# Patient Record
Sex: FEMALE | Race: WHITE | NOT HISPANIC OR LATINO | Employment: FULL TIME | URBAN - METROPOLITAN AREA
[De-identification: names, ages, dates, MRNs, and addresses within clinical notes are randomized per-mention and may not be internally consistent; named-entity substitution may affect disease eponyms.]

---

## 2023-10-16 ENCOUNTER — APPOINTMENT (OUTPATIENT)
Dept: PRIMARY CARE | Facility: CLINIC | Age: 28
End: 2023-10-16
Payer: COMMERCIAL

## 2023-10-16 ENCOUNTER — APPOINTMENT (OUTPATIENT)
Dept: GASTROENTEROLOGY | Facility: EXTERNAL LOCATION | Age: 28
End: 2023-10-16
Payer: COMMERCIAL

## 2023-10-16 DIAGNOSIS — K21.9 GASTRO-ESOPHAGEAL REFLUX DISEASE WITHOUT ESOPHAGITIS: ICD-10-CM

## 2023-11-02 ENCOUNTER — APPOINTMENT (OUTPATIENT)
Dept: PRIMARY CARE | Facility: CLINIC | Age: 28
End: 2023-11-02
Payer: COMMERCIAL

## 2024-05-28 ENCOUNTER — TRANSCRIBE ORDERS (OUTPATIENT)
Dept: OBSTETRICS AND GYNECOLOGY | Facility: CLINIC | Age: 29
End: 2024-05-28
Payer: COMMERCIAL

## 2024-05-28 DIAGNOSIS — N91.1 SECONDARY AMENORRHEA: Primary | ICD-10-CM

## 2024-06-06 ENCOUNTER — HOSPITAL ENCOUNTER (EMERGENCY)
Facility: HOSPITAL | Age: 29
Discharge: HOME | End: 2024-06-06
Attending: INTERNAL MEDICINE
Payer: COMMERCIAL

## 2024-06-06 ENCOUNTER — APPOINTMENT (OUTPATIENT)
Dept: RADIOLOGY | Facility: HOSPITAL | Age: 29
End: 2024-06-06
Payer: COMMERCIAL

## 2024-06-06 VITALS
WEIGHT: 115 LBS | DIASTOLIC BLOOD PRESSURE: 83 MMHG | SYSTOLIC BLOOD PRESSURE: 123 MMHG | RESPIRATION RATE: 15 BRPM | OXYGEN SATURATION: 98 % | BODY MASS INDEX: 22.58 KG/M2 | TEMPERATURE: 98.7 F | HEART RATE: 75 BPM | HEIGHT: 60 IN

## 2024-06-06 DIAGNOSIS — O20.9 VAGINAL BLEEDING AFFECTING EARLY PREGNANCY (HHS-HCC): Primary | ICD-10-CM

## 2024-06-06 LAB
ABO GROUP (TYPE) IN BLOOD: NORMAL
ALBUMIN SERPL BCP-MCNC: 4.6 G/DL (ref 3.4–5)
ALP SERPL-CCNC: 30 U/L (ref 33–110)
ALT SERPL W P-5'-P-CCNC: 6 U/L (ref 7–45)
AMORPH CRY #/AREA UR COMP ASSIST: ABNORMAL /HPF
ANION GAP SERPL CALC-SCNC: 12 MMOL/L (ref 10–20)
ANTIBODY SCREEN: NORMAL
APPEARANCE UR: ABNORMAL
AST SERPL W P-5'-P-CCNC: 13 U/L (ref 9–39)
B-HCG SERPL-ACNC: 1124 MIU/ML
BACTERIA #/AREA URNS AUTO: ABNORMAL /HPF
BASOPHILS # BLD AUTO: 0.04 X10*3/UL (ref 0–0.1)
BASOPHILS NFR BLD AUTO: 0.5 %
BILIRUB SERPL-MCNC: 0.2 MG/DL (ref 0–1.2)
BILIRUB UR STRIP.AUTO-MCNC: NEGATIVE MG/DL
BUN SERPL-MCNC: 7 MG/DL (ref 6–23)
CALCIUM SERPL-MCNC: 9 MG/DL (ref 8.6–10.3)
CHLORIDE SERPL-SCNC: 106 MMOL/L (ref 98–107)
CO2 SERPL-SCNC: 24 MMOL/L (ref 21–32)
COLOR UR: ABNORMAL
CREAT SERPL-MCNC: 0.66 MG/DL (ref 0.5–1.05)
EGFRCR SERPLBLD CKD-EPI 2021: >90 ML/MIN/1.73M*2
EOSINOPHIL # BLD AUTO: 0.04 X10*3/UL (ref 0–0.7)
EOSINOPHIL NFR BLD AUTO: 0.5 %
ERYTHROCYTE [DISTWIDTH] IN BLOOD BY AUTOMATED COUNT: 11.9 % (ref 11.5–14.5)
GLUCOSE SERPL-MCNC: 90 MG/DL (ref 74–99)
GLUCOSE UR STRIP.AUTO-MCNC: NORMAL MG/DL
HCT VFR BLD AUTO: 39.1 % (ref 36–46)
HGB BLD-MCNC: 12.7 G/DL (ref 12–16)
IMM GRANULOCYTES # BLD AUTO: 0.03 X10*3/UL (ref 0–0.7)
IMM GRANULOCYTES NFR BLD AUTO: 0.4 % (ref 0–0.9)
KETONES UR STRIP.AUTO-MCNC: NEGATIVE MG/DL
LEUKOCYTE ESTERASE UR QL STRIP.AUTO: NEGATIVE
LYMPHOCYTES # BLD AUTO: 2.32 X10*3/UL (ref 1.2–4.8)
LYMPHOCYTES NFR BLD AUTO: 29.1 %
MCH RBC QN AUTO: 29.5 PG (ref 26–34)
MCHC RBC AUTO-ENTMCNC: 32.5 G/DL (ref 32–36)
MCV RBC AUTO: 91 FL (ref 80–100)
MONOCYTES # BLD AUTO: 0.46 X10*3/UL (ref 0.1–1)
MONOCYTES NFR BLD AUTO: 5.8 %
NEUTROPHILS # BLD AUTO: 5.08 X10*3/UL (ref 1.2–7.7)
NEUTROPHILS NFR BLD AUTO: 63.7 %
NITRITE UR QL STRIP.AUTO: NEGATIVE
NRBC BLD-RTO: 0 /100 WBCS (ref 0–0)
PH UR STRIP.AUTO: 8 [PH]
PLATELET # BLD AUTO: 323 X10*3/UL (ref 150–450)
POTASSIUM SERPL-SCNC: 4.2 MMOL/L (ref 3.5–5.3)
PROT SERPL-MCNC: 7.4 G/DL (ref 6.4–8.2)
PROT UR STRIP.AUTO-MCNC: NEGATIVE MG/DL
RBC # BLD AUTO: 4.31 X10*6/UL (ref 4–5.2)
RBC # UR STRIP.AUTO: ABNORMAL /UL
RBC #/AREA URNS AUTO: ABNORMAL /HPF
RH FACTOR (ANTIGEN D): NORMAL
SODIUM SERPL-SCNC: 138 MMOL/L (ref 136–145)
SP GR UR STRIP.AUTO: 1.01
SQUAMOUS #/AREA URNS AUTO: ABNORMAL /HPF
UROBILINOGEN UR STRIP.AUTO-MCNC: NORMAL MG/DL
WBC # BLD AUTO: 8 X10*3/UL (ref 4.4–11.3)
WBC #/AREA URNS AUTO: ABNORMAL /HPF

## 2024-06-06 PROCEDURE — 76801 OB US < 14 WKS SINGLE FETUS: CPT

## 2024-06-06 PROCEDURE — 86901 BLOOD TYPING SEROLOGIC RH(D): CPT

## 2024-06-06 PROCEDURE — 76815 OB US LIMITED FETUS(S): CPT | Performed by: RADIOLOGY

## 2024-06-06 PROCEDURE — 86900 BLOOD TYPING SEROLOGIC ABO: CPT

## 2024-06-06 PROCEDURE — 85025 COMPLETE CBC W/AUTO DIFF WBC: CPT

## 2024-06-06 PROCEDURE — 99284 EMERGENCY DEPT VISIT MOD MDM: CPT | Mod: 25

## 2024-06-06 PROCEDURE — 84702 CHORIONIC GONADOTROPIN TEST: CPT

## 2024-06-06 PROCEDURE — 81001 URINALYSIS AUTO W/SCOPE: CPT

## 2024-06-06 PROCEDURE — 87086 URINE CULTURE/COLONY COUNT: CPT | Mod: AHULAB

## 2024-06-06 PROCEDURE — 76817 TRANSVAGINAL US OBSTETRIC: CPT | Performed by: RADIOLOGY

## 2024-06-06 PROCEDURE — 36415 COLL VENOUS BLD VENIPUNCTURE: CPT

## 2024-06-06 PROCEDURE — 80053 COMPREHEN METABOLIC PANEL: CPT

## 2024-06-06 RX ORDER — ACETAMINOPHEN 325 MG/1
650 TABLET ORAL ONCE
Status: COMPLETED | OUTPATIENT
Start: 2024-06-06 | End: 2024-06-06

## 2024-06-06 RX ADMIN — ACETAMINOPHEN 650 MG: 325 TABLET ORAL at 15:43

## 2024-06-06 ASSESSMENT — COLUMBIA-SUICIDE SEVERITY RATING SCALE - C-SSRS
6. HAVE YOU EVER DONE ANYTHING, STARTED TO DO ANYTHING, OR PREPARED TO DO ANYTHING TO END YOUR LIFE?: NO
1. IN THE PAST MONTH, HAVE YOU WISHED YOU WERE DEAD OR WISHED YOU COULD GO TO SLEEP AND NOT WAKE UP?: NO
2. HAVE YOU ACTUALLY HAD ANY THOUGHTS OF KILLING YOURSELF?: NO

## 2024-06-06 ASSESSMENT — PAIN - FUNCTIONAL ASSESSMENT: PAIN_FUNCTIONAL_ASSESSMENT: 0-10

## 2024-06-06 ASSESSMENT — PAIN SCALES - GENERAL: PAINLEVEL_OUTOF10: 6

## 2024-06-06 NOTE — Clinical Note
Elmira Harrison was seen and treated in our emergency department on 6/6/2024.  She may return to work on 06/11/2024.       If you have any questions or concerns, please don't hesitate to call.      Marnie Valdovinos PA-C

## 2024-06-06 NOTE — ED PROVIDER NOTES
HPI   Chief Complaint   Patient presents with    Threatened Miscarriage       HPI  HISTORY OF PRESENT ILLNESS:  29 y.o. female  at estimated 7 weeks gestation presenting to the ED with complaint of abdominal cramping and vaginal bleeding.  Patient is unsure exactly how far along she is, believes she is about 7 weeks pregnant.  She states that today while at work, she began having lower abdominal cramping, slightly worse on the left side, but present across the lower abdomen.  She states that about an hour prior to arrival she began having light vaginal bleeding.  Has not bled through a pad since onset.  She denies any similar symptoms with her prior pregnancy.  She has not yet followed up with OB/GYN has not yet had ultrasound or prenatal care for this pregnancy.  She denies fevers or chills.  No nausea or vomiting, no constipation or diarrhea.  No abnormal vaginal discharge, odor, itching, or pain, denies any concern for STDs.  Denies urinary symptoms.  No chest pain or shortness of breath.  No dizziness or lightheadedness, no syncope.  No other complaints or symptoms voiced.    12 point review of systems was performed and is negative unless otherwise specified in HPI.    PMH: reviewed  Family history: noncontributory  Social history: non smoker, no ETOH, no illicit substances  Past Medical History:   Diagnosis Date    23 weeks gestation of pregnancy ([facility]) 2017    23 weeks gestation of pregnancy    Acute gastric ulcer without hemorrhage or perforation 2015    Acute gastric ulcer    Acute upper respiratory infection, unspecified 10/01/2020    Acute upper respiratory infection    Allergic purpura (CMS-HCC) 2013    Henoch-Schonlein purpura    Chest pain, unspecified 2013    Chest pain    Contusion of unspecified back wall of thorax, initial encounter 2013    Contusion of back    Edema, unspecified 2013    Edema    Gastro-esophageal reflux disease without esophagitis  12/11/2013    Esophageal reflux    Intestinal malabsorption, unspecified ([facility]) 12/11/2013    Malabsorption syndrome    Iron deficiency anemia secondary to blood loss (chronic) 12/11/2013    Iron deficiency anemia due to chronic blood loss    Nausea 12/11/2013    Nausea    Nonspecific lymphadenitis, unspecified 12/11/2013    Lymphadenitis    Other conditions influencing health status 12/11/2013    Endomyometritis    Other conditions influencing health status 12/11/2013    Inflammatory Myopathy (Myositis)    Other conditions influencing health status 12/11/2013    Attention-deficit Hyperactivity Disorder    Other conditions influencing health status     History of burning on urination    Other disorders of intestinal carbohydrate absorption 12/11/2013    Glucose intolerance (malabsorption)    Other fatigue 12/11/2013    Fatigue    Other pulmonary embolism without acute cor pulmonale (Multi) 12/11/2013    Pulmonary embolism    Pain in unspecified shoulder 12/11/2013    Pain, joint, shoulder    Personal history of other diseases of the female genital tract 02/22/2016    History of endometriosis    Personal history of other diseases of the respiratory system 10/25/2016    History of acute pharyngitis    Personal history of other specified conditions 11/30/2016    History of abnormal weight loss    Personal history of other specified conditions 11/04/2016    History of fatigue    Phlebitis and thrombophlebitis of unspecified site 12/11/2013    Thrombophlebitis    Sciatica, unspecified side 12/11/2013    Sciatica    Unspecified abdominal pain 12/11/2013    Abdominal pain    Unspecified sprain of unspecified wrist, initial encounter 05/10/2017    Wrist sprain    Zoster without complications 12/11/2013    Herpes zoster         Abnormal Labs Reviewed   HUMAN CHORIONIC GONADOTROPIN, SERUM QUANTITATIVE - Abnormal; Notable for the following components:       Result Value    HCG, Beta-Quantitative 1,124 (*)     All other  components within normal limits    Narrative:      Total HCG measurement is performed using the [employer] Estef[employer] Access   Immunoassay which detects intact HCG and free beta HCG subunit.    This test is not indicated for use as a tumor marker.   HCG testing is performed using a different test methodology at [facility] than other [facility]. Direct result comparison   should only be made within the same method.       COMPREHENSIVE METABOLIC PANEL - Abnormal; Notable for the following components:    Alkaline Phosphatase 30 (*)     ALT 6 (*)     All other components within normal limits   URINALYSIS WITH REFLEX CULTURE AND MICROSCOPIC - Abnormal; Notable for the following components:    Appearance, Urine Turbid (*)     Blood, Urine 1.0 (3+) (*)     All other components within normal limits   URINALYSIS MICROSCOPIC WITH REFLEX CULTURE - Abnormal; Notable for the following components:    WBC, Urine 6-10 (*)     Bacteria, Urine 1+ (*)     Amorphous Crystals, Urine 4+ (*)     All other components within normal limits      US PELVIS OB TRANSABDOMINAL W TRANSVAGINAL UP TO 1ST TRIMESTER   Final Result   No intrauterine gestation is observed.        The endometrium is thickened at 1.6 cm and heterogeneous in   appearance. Based on history, the findings are most likely due to   incomplete spontaneous  with some retained products of   conception suggested. I would recommend repeat serial beta HCG level   in 48 hours.        The possibility of an ectopic gestation not visualized   transabdominally or transvaginally can not be entirely excluded.        Signed by: Gayle Lorenz 2024 4:52 PM   Dictation workstation:   [id number]                  No data recorded                   Patient History   Past Medical History:   Diagnosis Date    23 weeks gestation of pregnancy ([facility]) 2017    23 weeks gestation of pregnancy    Acute gastric ulcer without hemorrhage or perforation 2015    Acute  gastric ulcer    Acute upper respiratory infection, unspecified 10/01/2020    Acute upper respiratory infection    Allergic purpura (CMS-HCC) 12/11/2013    Henoch-Schonlein purpura    Chest pain, unspecified 12/11/2013    Chest pain    Contusion of unspecified back wall of thorax, initial encounter 12/11/2013    Contusion of back    Edema, unspecified 12/11/2013    Edema    Gastro-esophageal reflux disease without esophagitis 12/11/2013    Esophageal reflux    Intestinal malabsorption, unspecified ([facility]) 12/11/2013    Malabsorption syndrome    Iron deficiency anemia secondary to blood loss (chronic) 12/11/2013    Iron deficiency anemia due to chronic blood loss    Nausea 12/11/2013    Nausea    Nonspecific lymphadenitis, unspecified 12/11/2013    Lymphadenitis    Other conditions influencing health status 12/11/2013    Endomyometritis    Other conditions influencing health status 12/11/2013    Inflammatory Myopathy (Myositis)    Other conditions influencing health status 12/11/2013    Attention-deficit Hyperactivity Disorder    Other conditions influencing health status     History of burning on urination    Other disorders of intestinal carbohydrate absorption 12/11/2013    Glucose intolerance (malabsorption)    Other fatigue 12/11/2013    Fatigue    Other pulmonary embolism without acute cor pulmonale (Multi) 12/11/2013    Pulmonary embolism    Pain in unspecified shoulder 12/11/2013    Pain, joint, shoulder    Personal history of other diseases of the female genital tract 02/22/2016    History of endometriosis    Personal history of other diseases of the respiratory system 10/25/2016    History of acute pharyngitis    Personal history of other specified conditions 11/30/2016    History of abnormal weight loss    Personal history of other specified conditions 11/04/2016    History of fatigue    Phlebitis and thrombophlebitis of unspecified site 12/11/2013    Thrombophlebitis    Sciatica, unspecified side  12/11/2013    Sciatica    Unspecified abdominal pain 12/11/2013    Abdominal pain    Unspecified sprain of unspecified wrist, initial encounter 05/10/2017    Wrist sprain    Zoster without complications 12/11/2013    Herpes zoster     Past Surgical History:   Procedure Laterality Date    CHOLECYSTECTOMY  06/03/2016    Cholecystectomy Laparoscopic    LAPAROSCOPY DIAGNOSTIC / BIOPSY / ASPIRATION / LYSIS  02/22/2016    Exploratory Laparoscopy     No family history on file.  Social History     Tobacco Use    Smoking status: Not on file    Smokeless tobacco: Not on file   Substance Use Topics    Alcohol use: Not on file    Drug use: Not on file       Physical Exam   ED Triage Vitals [06/06/24 1457]   Temperature Heart Rate Respirations BP   37.1 °C (98.7 °F) 75 15 123/83      Pulse Ox Temp src Heart Rate Source Patient Position   98 % -- -- --      BP Location FiO2 (%)     -- --       Physical Exam  Constitutional:       General: She is not in acute distress.     Appearance: She is not toxic-appearing.   HENT:      Head: Normocephalic and atraumatic.      Nose: No congestion.      Mouth/Throat:      Mouth: Mucous membranes are moist.   Eyes:      General: No scleral icterus.     Extraocular Movements: Extraocular movements intact.      Pupils: Pupils are equal, round, and reactive to light.   Cardiovascular:      Rate and Rhythm: Normal rate and regular rhythm.      Pulses: Normal pulses.   Pulmonary:      Effort: Pulmonary effort is normal. No respiratory distress.   Abdominal:      General: There is no distension.      Palpations: Abdomen is soft.      Tenderness: There is abdominal tenderness. There is no guarding or rebound.      Comments: Mild tenderness LLQ and suprapubic area. No distention, no guarding or rigidity, no peritoneal signs.   Genitourinary:     Comments: Pelvic:  Chaperone present.  Normal external genitalia. No laceration or foreign body.  Small amount of dark red blood in vaginal vault, no active  hemorrhage.  Cervix os is OPEN. No cervical motion tenderness.  No adnexal tenderness.   Musculoskeletal:         General: Normal range of motion.      Cervical back: Normal range of motion and neck supple. No rigidity.      Right lower leg: No edema.      Left lower leg: No edema.   Skin:     General: Skin is warm and dry.      Capillary Refill: Capillary refill takes less than 2 seconds.   Neurological:      General: No focal deficit present.      Mental Status: She is alert and oriented to person, place, and time.      Cranial Nerves: No cranial nerve deficit.      Coordination: Coordination normal.      Gait: Gait normal.   Psychiatric:         Mood and Affect: Mood normal.         Behavior: Behavior normal.         Judgment: Judgment normal.         ED Course & MDM   ED Course as of 24   Thu  Discussed with OB/GYN attending Dr. Frankel via phone, discussed patient case and presentation, labs, and imaging, agrees most likely spontaneous , recommends close follow-up outpatient tomorrow Monday for a repeat quantitative hCG, and strict return precautions if symptoms worsen. [EH]      ED Course User Index  [EH] Marnie Valdovinos PA-C         Diagnoses as of 24   Vaginal bleeding affecting early pregnancy ([facility])       Medical Decision Making  ED course / MDM     Summary:  Patient presented with lower abdominal cramping and vaginal bleeding, estimated 7 weeks gestation.  Vital signs are stable, patient is overall very well-appearing.  Ambulates unassisted, no acute distress.  On exam, there is mild tenderness in the lower abdomen, no distention, no guarding or rigidity.  No CVA tenderness.  On pelvic exam, there is a small amount of dark red blood in the vaginal vault, cervical os is open, nontender pelvic.  IV established, labs drawn.  Labs show no leukocytosis, normal hemoglobin, no significant electrolyte abnormalities, normal kidney function.  UA noninfected.  hCG  is 1124.  Rh+, does not require RhoGAM.  Pelvic ultrasound shows no IUP, thickened endometrium, likely incomplete spontaneous , recommending repeat serial beta hCG level, does note possibility of ectopic cannot be excluded as an IUP is not visualized, however no adnexal masses noted.  Discussed with gynecology attending Dr. Frankel, agrees likely spontaneous , recommends follow-up in clinic tomorrow or Monday for repeat beta-hCG and strict return precautions to come back to the ED for any worsening symptoms.  Patient does have an appointment with her OB/GYN tomorrow.  Patient case discussed with ED attending Dr. Aiken, who also saw and evaluated the patient. Results and differential were discussed in detail with the patient.  Patient is in agreement the plan for discharge and close outpatient OB/GYN follow-up.  We discussed reasons to return to the ED, discussed signs and symptoms of ectopic pregnancy, as well as hemodynamically significant hemorrhage.  She is in agreement with the plan for discharge and close outpatient follow-up. Patient was given strict return precautions, understands reasons to return to the ED. Also discussed supportive care instructions. I expressed the importance of outpatient follow up with their PCP. All questions were answered, patient expressed understanding and stated that they would comply.    Impression:  1. See diagnosis    Plan: Homegoing. I discussed the differential, results, and discharge plan with the patient and family/friend/caregiver. Patient was advised to follow up with PCP or recommended provider in 2-3 days for another evaluation and exam. I emphasized the importance of follow-up with the physician I referred them to in the timeframe recommended.  I explained reasons for the patient to return to the Emergency Department. They agreed that if they feel their condition is worsening,  if symptoms change, get worse, new symptoms develop prior to follow up, or  if they have any other concern they should call 911 immediately for further assistance. If there is no improvement in symptoms in the next 24 hours they are advised to return for further evaluation and exam. I also explained the plan and treatment course. We also discussed medications that were prescribed including common side effects and interactions. The patient was advised to abstain from driving, operating heavy machinery, or making significant decisions while taking medications such as opiates and muscle relaxers that may impair this. I gave the patient an opportunity to ask all questions they had and answered all of them accordingly. They understand return precautions and discharge instructions. Patient and family/friend/caregiver/guardian is in agreement with plan, treatment course, and follow up and states verbally that they will comply.     Disposition: Discharge    Patient seen and discussed with Dr. Aiken    This note has been transcribed using voice recognition and may contain grammatical errors, misplaced words, incorrect words, incorrect phrases or other errors.   Procedure  Procedures     Marnie Valdovinos PA-C  06/06/24 5560

## 2024-06-07 ENCOUNTER — HOSPITAL ENCOUNTER (OUTPATIENT)
Dept: RADIOLOGY | Facility: CLINIC | Age: 29
Discharge: HOME | End: 2024-06-07
Payer: COMMERCIAL

## 2024-06-07 ENCOUNTER — OFFICE VISIT (OUTPATIENT)
Dept: OBSTETRICS AND GYNECOLOGY | Facility: CLINIC | Age: 29
End: 2024-06-07
Payer: COMMERCIAL

## 2024-06-07 ENCOUNTER — LAB (OUTPATIENT)
Dept: LAB | Facility: LAB | Age: 29
End: 2024-06-07
Payer: COMMERCIAL

## 2024-06-07 VITALS — SYSTOLIC BLOOD PRESSURE: 100 MMHG | WEIGHT: 115 LBS | BODY MASS INDEX: 22.46 KG/M2 | DIASTOLIC BLOOD PRESSURE: 60 MMHG

## 2024-06-07 DIAGNOSIS — O20.0 THREATENED ABORTION, ANTEPARTUM CONDITION OR COMPLICATION (HHS-HCC): ICD-10-CM

## 2024-06-07 LAB
B-HCG SERPL-ACNC: 836 MIU/ML
BACTERIA UR CULT: NO GROWTH

## 2024-06-07 PROCEDURE — 36415 COLL VENOUS BLD VENIPUNCTURE: CPT

## 2024-06-07 PROCEDURE — 84702 CHORIONIC GONADOTROPIN TEST: CPT

## 2024-06-07 PROCEDURE — 88305 TISSUE EXAM BY PATHOLOGIST: CPT

## 2024-06-07 PROCEDURE — 99214 OFFICE O/P EST MOD 30 MIN: CPT | Performed by: OBSTETRICS & GYNECOLOGY

## 2024-06-07 PROCEDURE — 1036F TOBACCO NON-USER: CPT | Performed by: OBSTETRICS & GYNECOLOGY

## 2024-06-07 RX ORDER — FLECAINIDE ACETATE 100 MG/1
100 TABLET ORAL 2 TIMES DAILY
COMMUNITY
Start: 2024-05-06

## 2024-06-07 RX ORDER — DILTIAZEM HYDROCHLORIDE 120 MG/1
120 CAPSULE, COATED, EXTENDED RELEASE ORAL
COMMUNITY
Start: 2024-02-26

## 2024-06-07 RX ORDER — CLOBETASOL PROPIONATE 0.46 MG/ML
SOLUTION TOPICAL
COMMUNITY
Start: 2024-04-19

## 2024-06-07 ASSESSMENT — ENCOUNTER SYMPTOMS
HEMATOCHEZIA: 0
BELCHING: 0
NAUSEA: 1
DYSURIA: 0
FREQUENCY: 1
VOMITING: 1
MYALGIAS: 0
DIARRHEA: 0
WEIGHT LOSS: 0
FEVER: 0
FLATUS: 1
HEADACHES: 1
ANOREXIA: 1
CONSTIPATION: 1
HEMATURIA: 0
ARTHRALGIAS: 0
ABDOMINAL PAIN: 1

## 2024-06-07 NOTE — PROGRESS NOTES
Patient is a , pregnancy of undetermined location 1, EAB 1 who presents with bleeding and cramping in early pregnancy.  Last menstrual period was 2024.  Cycles however, are regular and has a history of endometriosis.  Began cramping mid to left and bleeding yesterday lightly.  Was seen in the ED and no gestational sac seen and quantitative beta-hCG was 1100.  Today, cramping midline intensifies a bit, bleeding is heavier.  Apparently in 2019 she had a pregnancy of undetermined location which was treated with methotrexate.  There was no clear documentation of ectopic pregnancy.    REVIEW OF SYSTEMS    Please see HPI for reported pertinent positives, which would supersede this ROS    Constitutional:  Denies fever, chills, wt gain or loss, fatigue    Genito-Urinary:  Denies genital lesion or sores, vaginal dryness, itching  or pain.  No abnormal vaginal discharge or unexplained vaginal bleeding.  No dysuria, urinary incontinence or frequency.  Denies pelvic pain, dysmenorrhea or dyspareunia.    Eyes:  Denies vision changes, dryness  ENT:  No hearing loss, sinus pain or congestion, nosebleeds  Cardiovascular:  No chest pain or palpitations  Respiratory:  No SOB, cough, wheezing  GI:  No Nausea, vomiting, diarrhea, constipation, abdominal pain  Musculoskeletal:  No new back pain. joint pain, peripheral edema  Skin:  No rash or skin lesion  Neurologic:  No HA, numbness or dizziness  Psychiatric:  No new anxiety or depression  Endocrine:  No loss of hair or hirsutism  Hematologic/lymphatic:  No swollen lymph nodes.  No reported tendency for easy bruising or bleeding    Patient admits to no other systemic complaints      PHYSICAL EXAM      PSYCH:  Pt is alert, oriented and cooperative    SKIN: warm, dry, w/o lesion    HEAD AND FACE:  External inspection of eyes, ears, functional cranial nerves normal and intact    THYROID:  No thyromegaly    CARDIOVASCULAR:  Warm and well Perfused    PULMONARY:  No respiratory  distress    ABDOMEN:  soft, nontender.  No mass or organomegaly.      PELVIC:    External genitalia, urethra, perianal region normal to inspection and palpation if indicated.  No inguinal LA    Vagina without lesions.    Cervix seen and visually normal  Moderate blood with the cervix.  Organized clot versus tissue at the cervix was removed and sent to pathology.    Bimanual exam:      No pelvic mass palpable    Uterus mildly tender, 8 weeks size.  No adnexal tenderness on the left or right.    No adnexal masses or tenderness      Assessment/Plan    Diagnoses and all orders for this visit:  Threatened , antepartum condition or complication ([facility]) -lengthy discussion with patient about hCG discriminatory zone, ultrasound showing no evidence of intrauterine pregnancy yesterday.  Discussed potential ectopic pregnancy and sequela including tubal rupture.  Given hCG yesterday would not treat with methotrexate and no clinical instability suggesting need for surgery at this point.  Will repeat hCG level today and repeat 2 days later.  Discussed ectopic warnings.  Given increase in bleeding and passage of clotting and more blood, spontaneous AB more likely.  -     US pelvis; Future  -     Human Chorionic Gonadotropin, Serum Quantitative; Standing  -     Surgical Pathology Exam

## 2024-06-10 ENCOUNTER — TELEPHONE (OUTPATIENT)
Dept: OBSTETRICS AND GYNECOLOGY | Facility: CLINIC | Age: 29
End: 2024-06-10

## 2024-06-10 ENCOUNTER — LAB (OUTPATIENT)
Dept: LAB | Facility: LAB | Age: 29
End: 2024-06-10
Payer: COMMERCIAL

## 2024-06-10 DIAGNOSIS — O20.0 THREATENED ABORTION, ANTEPARTUM CONDITION OR COMPLICATION (HHS-HCC): ICD-10-CM

## 2024-06-10 DIAGNOSIS — F41.9 ANXIETY: Primary | ICD-10-CM

## 2024-06-10 LAB
B-HCG SERPL-ACNC: 127 MIU/ML
LABORATORY COMMENT REPORT: NORMAL
PATH REPORT.FINAL DX SPEC: NORMAL
PATH REPORT.GROSS SPEC: NORMAL
PATH REPORT.RELEVANT HX SPEC: NORMAL
PATH REPORT.TOTAL CANCER: NORMAL

## 2024-06-10 PROCEDURE — 84702 CHORIONIC GONADOTROPIN TEST: CPT

## 2024-06-10 PROCEDURE — 36415 COLL VENOUS BLD VENIPUNCTURE: CPT

## 2024-06-10 RX ORDER — ALPRAZOLAM 0.25 MG/1
0.25 TABLET ORAL NIGHTLY PRN
Qty: 5 TABLET | Refills: 0 | Status: SHIPPED | OUTPATIENT
Start: 2024-06-10

## 2024-06-10 NOTE — TELEPHONE ENCOUNTER
----- Message from Kami Rasheed RN sent at 6/10/2024  8:00 AM EDT -----  Regarding: FW: Cramping  Contact: [phone]    ----- Message -----  From: Elmira Harrison  Sent: 6/7/2024   5:40 PM EDT  To: Do Richards[facility] Clinical Support Staff  Subject: Cramping                                         Pain has gotten a bit worse and the cramps are lasting longer, is there anything that you could prescribe to help with it just for a few days until it subsides? I’ve tried Tylenol/Motrin it hasn’t been touching it.       ........    Patient continues to have bleeding along with midline cramping.  Past few clots over the weekend but not excessive.  hCG level drawn this morning still pending.  Has flight to [place] tomorrow and planning on taking but is noticing an increase in panic attacks.  Patient has had this in the past.  Will prescribe a limited number of Xanax 0.25, #5 for use for flight there, back and potential use in between those flights.

## 2024-06-17 ENCOUNTER — APPOINTMENT (OUTPATIENT)
Dept: PRIMARY CARE | Facility: CLINIC | Age: 29
End: 2024-06-17
Payer: COMMERCIAL

## 2024-06-17 ENCOUNTER — LAB (OUTPATIENT)
Dept: LAB | Facility: LAB | Age: 29
End: 2024-06-17
Payer: COMMERCIAL

## 2024-06-17 VITALS
WEIGHT: 118.4 LBS | HEIGHT: 60 IN | HEART RATE: 65 BPM | OXYGEN SATURATION: 100 % | BODY MASS INDEX: 23.25 KG/M2 | DIASTOLIC BLOOD PRESSURE: 60 MMHG | SYSTOLIC BLOOD PRESSURE: 100 MMHG

## 2024-06-17 DIAGNOSIS — O20.0 THREATENED ABORTION, ANTEPARTUM CONDITION OR COMPLICATION (HHS-HCC): ICD-10-CM

## 2024-06-17 DIAGNOSIS — F41.9 ANXIETY: Primary | ICD-10-CM

## 2024-06-17 DIAGNOSIS — F41.9 ANXIETY: ICD-10-CM

## 2024-06-17 DIAGNOSIS — D68.2 FACTOR V DEFICIENCY (MULTI): ICD-10-CM

## 2024-06-17 DIAGNOSIS — D68.2 FACTOR V DEFICIENCY (MULTI): Primary | ICD-10-CM

## 2024-06-17 LAB — B-HCG SERPL-ACNC: 11 MIU/ML

## 2024-06-17 PROCEDURE — 1036F TOBACCO NON-USER: CPT | Performed by: FAMILY MEDICINE

## 2024-06-17 PROCEDURE — 99213 OFFICE O/P EST LOW 20 MIN: CPT | Performed by: FAMILY MEDICINE

## 2024-06-17 PROCEDURE — 84702 CHORIONIC GONADOTROPIN TEST: CPT

## 2024-06-17 PROCEDURE — 36415 COLL VENOUS BLD VENIPUNCTURE: CPT

## 2024-06-17 RX ORDER — ESCITALOPRAM OXALATE 5 MG/1
5 TABLET ORAL DAILY
Qty: 30 TABLET | Refills: 1 | Status: SHIPPED | OUTPATIENT
Start: 2024-06-17 | End: 2024-09-15

## 2024-06-17 RX ORDER — HYDROXYZINE PAMOATE 25 MG/1
25 CAPSULE ORAL 3 TIMES DAILY PRN
Qty: 30 CAPSULE | Refills: 0 | Status: SHIPPED | OUTPATIENT
Start: 2024-06-17 | End: 2024-06-27

## 2024-06-17 RX ORDER — PROPRANOLOL HYDROCHLORIDE 20 MG/1
20 TABLET ORAL 2 TIMES DAILY PRN
Qty: 60 TABLET | Refills: 0 | Status: SHIPPED | OUTPATIENT
Start: 2024-06-17 | End: 2024-12-14

## 2024-06-17 ASSESSMENT — PATIENT HEALTH QUESTIONNAIRE - PHQ9
6. FEELING BAD ABOUT YOURSELF - OR THAT YOU ARE A FAILURE OR HAVE LET YOURSELF OR YOUR FAMILY DOWN: NOT AT ALL
4. FEELING TIRED OR HAVING LITTLE ENERGY: MORE THAN HALF THE DAYS
5. POOR APPETITE OR OVEREATING: MORE THAN HALF THE DAYS
3. TROUBLE FALLING OR STAYING ASLEEP OR SLEEPING TOO MUCH: NEARLY EVERY DAY
1. LITTLE INTEREST OR PLEASURE IN DOING THINGS: NOT AT ALL
7. TROUBLE CONCENTRATING ON THINGS, SUCH AS READING THE NEWSPAPER OR WATCHING TELEVISION: MORE THAN HALF THE DAYS
SUM OF ALL RESPONSES TO PHQ QUESTIONS 1-9: 11
2. FEELING DOWN, DEPRESSED OR HOPELESS: NOT AT ALL
8. MOVING OR SPEAKING SO SLOWLY THAT OTHER PEOPLE COULD HAVE NOTICED. OR THE OPPOSITE, BEING SO FIGETY OR RESTLESS THAT YOU HAVE BEEN MOVING AROUND A LOT MORE THAN USUAL: MORE THAN HALF THE DAYS
9. THOUGHTS THAT YOU WOULD BE BETTER OFF DEAD, OR OF HURTING YOURSELF: NOT AT ALL
SUM OF ALL RESPONSES TO PHQ9 QUESTIONS 1 AND 2: 0
10. IF YOU CHECKED OFF ANY PROBLEMS, HOW DIFFICULT HAVE THESE PROBLEMS MADE IT FOR YOU TO DO YOUR WORK, TAKE CARE OF THINGS AT HOME, OR GET ALONG WITH OTHER PEOPLE: VERY DIFFICULT

## 2024-06-17 ASSESSMENT — ANXIETY QUESTIONNAIRES
IF YOU CHECKED OFF ANY PROBLEMS ON THIS QUESTIONNAIRE, HOW DIFFICULT HAVE THESE PROBLEMS MADE IT FOR YOU TO DO YOUR WORK, TAKE CARE OF THINGS AT HOME, OR GET ALONG WITH OTHER PEOPLE: VERY DIFFICULT
3. WORRYING TOO MUCH ABOUT DIFFERENT THINGS: NEARLY EVERY DAY
1. FEELING NERVOUS, ANXIOUS, OR ON EDGE: NEARLY EVERY DAY
4. TROUBLE RELAXING: MORE THAN HALF THE DAYS
GAD7 TOTAL SCORE: 18
2. NOT BEING ABLE TO STOP OR CONTROL WORRYING: NEARLY EVERY DAY
7. FEELING AFRAID AS IF SOMETHING AWFUL MIGHT HAPPEN: NEARLY EVERY DAY
6. BECOMING EASILY ANNOYED OR IRRITABLE: NEARLY EVERY DAY
5. BEING SO RESTLESS THAT IT IS HARD TO SIT STILL: SEVERAL DAYS

## 2024-06-17 NOTE — PROGRESS NOTES
Subjective   Patient ID: Elmira Harrison is a 29 y.o. female who presents for Hospital Follow-up (Miscarriage).    HPI    CARDIAC ARRHYTHMIA:  Feeling recurring palpitations  Failed Atenolol  Failed Metoprolol -rash    Recent miscarriage. 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     Objective       DENISA-7 Total Score: 18     VITALS:  /60 (BP Location: Left arm, Patient Position: Sitting, BP Cuff Size: Adult)   Pulse 65   Ht 1.524 m (5')   Wt 53.7 kg (118 lb 6.4 oz)   LMP 04/27/2024   SpO2 100%   Breastfeeding Unknown   BMI 23.12 kg/m²      Physical Exam  Constitutional:       General: She is not in acute distress.     Appearance: Normal appearance. She is not ill-appearing.   HENT:      Head: Normocephalic and atraumatic.   Neck:      Vascular: No carotid bruit.   Cardiovascular:      Rate and Rhythm: Normal rate and regular rhythm.      Pulses: Normal pulses.      Heart sounds: Normal heart sounds. No murmur heard.     No gallop.   Pulmonary:      Effort: Pulmonary effort is normal.      Breath sounds: Normal breath sounds. No wheezing, rhonchi or rales.   Musculoskeletal:      Cervical back: Normal range of motion and neck supple. No rigidity or tenderness.   Lymphadenopathy:      Cervical: No cervical adenopathy.   Skin:     General: Skin is warm and dry.   Neurological:      Mental Status: She is alert.   Psychiatric:         Mood and Affect: Mood normal.         Behavior: Behavior normal.         Assessment/Plan   Problem List Items Addressed This Visit       Anxiety - Primary     Trial of low dose Escitalopram.  Reviewed risks, side effects and expected treatment course of medications.  Call with any problems or  concerns.   Follow up 1-2 months for recheck.         Relevant Medications    propranolol (Inderal) 20 mg tablet    escitalopram (Lexapro) 5 mg tablet    Factor V deficiency (Multi)     Referral to Hematology.         Relevant Orders    Referral to Hematology and Oncology       FOLLOW UP 1 MONTH, SOONER WITH ANY PROBLEMS OR CONCERNS.

## 2024-06-19 ENCOUNTER — APPOINTMENT (OUTPATIENT)
Dept: RADIOLOGY | Facility: CLINIC | Age: 29
End: 2024-06-19
Payer: COMMERCIAL

## 2024-06-19 ENCOUNTER — APPOINTMENT (OUTPATIENT)
Dept: OBSTETRICS AND GYNECOLOGY | Facility: CLINIC | Age: 29
End: 2024-06-19
Payer: COMMERCIAL

## 2024-06-19 VITALS — WEIGHT: 118 LBS | BODY MASS INDEX: 23.05 KG/M2

## 2024-06-19 DIAGNOSIS — O02.1 MISSED ABORTION (HHS-HCC): ICD-10-CM

## 2024-06-19 PROCEDURE — 99213 OFFICE O/P EST LOW 20 MIN: CPT | Performed by: OBSTETRICS & GYNECOLOGY

## 2024-06-19 PROCEDURE — 1036F TOBACCO NON-USER: CPT | Performed by: OBSTETRICS & GYNECOLOGY

## 2024-06-19 NOTE — PROGRESS NOTES
Chief Complaint   Patient presents with    MISSED AB     Missed AB    [id number]    Patient following up from Missed AB.  Per patient she has stopped bleeding 3 days ago, is unable to go on a birth control medication due to Factor V.  Overall feeling well and has no concerns at this time.    Chaperone declined.     Patient is status post very early complete SAB.    Overall feels reasonably well.    Past history significant for endometriosis status post laparoscopy  Had a history of a DVT as a teenager and is seeing hematology soon to be tested for genetic predisposition including factor V Leiden.  Discussed.    This point does not want birth control but we did discuss the possibility of a progestin only pill if she needed birth control and that that could possibly improve endometriosis related symptoms.    Patient will follow-up as needed.

## 2024-06-30 PROBLEM — F41.9 ANXIETY: Status: ACTIVE | Noted: 2024-06-30

## 2024-06-30 PROBLEM — D68.2 FACTOR V DEFICIENCY (MULTI): Status: ACTIVE | Noted: 2024-06-30

## 2024-07-01 NOTE — ASSESSMENT & PLAN NOTE
Trial of low dose Escitalopram.  Reviewed risks, side effects and expected treatment course of medications.  Call with any problems or concerns.   Follow up 1-2 months for recheck.

## 2024-07-18 ENCOUNTER — TELEPHONE (OUTPATIENT)
Dept: OBSTETRICS AND GYNECOLOGY | Facility: CLINIC | Age: 29
End: 2024-07-18
Payer: COMMERCIAL

## 2024-07-19 ENCOUNTER — TELEPHONE (OUTPATIENT)
Dept: OBSTETRICS AND GYNECOLOGY | Facility: HOSPITAL | Age: 29
End: 2024-07-19
Payer: COMMERCIAL

## 2024-07-19 NOTE — TELEPHONE ENCOUNTER
Spoke w pt yesterday via tc.  Can feel no mass, pain intermittent, no rash, redness, fever, nipple discharge.  Anticipating next cycle in ~ 2 weeks.  LMP early JulyI  f persists beyond next cycle, see me

## 2024-07-29 ENCOUNTER — LAB (OUTPATIENT)
Dept: LAB | Facility: LAB | Age: 29
End: 2024-07-29
Payer: COMMERCIAL

## 2024-07-29 ENCOUNTER — APPOINTMENT (OUTPATIENT)
Dept: PRIMARY CARE | Facility: CLINIC | Age: 29
End: 2024-07-29
Payer: COMMERCIAL

## 2024-07-29 VITALS — SYSTOLIC BLOOD PRESSURE: 136 MMHG | DIASTOLIC BLOOD PRESSURE: 88 MMHG | OXYGEN SATURATION: 98 % | HEART RATE: 88 BPM

## 2024-07-29 DIAGNOSIS — R07.1 CHEST PAIN ON BREATHING: Primary | ICD-10-CM

## 2024-07-29 DIAGNOSIS — R07.1 CHEST PAIN ON BREATHING: ICD-10-CM

## 2024-07-29 DIAGNOSIS — F43.21 GRIEF REACTION: ICD-10-CM

## 2024-07-29 DIAGNOSIS — F41.9 ANXIETY: ICD-10-CM

## 2024-07-29 PROCEDURE — 99214 OFFICE O/P EST MOD 30 MIN: CPT | Performed by: FAMILY MEDICINE

## 2024-07-29 PROCEDURE — 36415 COLL VENOUS BLD VENIPUNCTURE: CPT

## 2024-07-29 PROCEDURE — 85027 COMPLETE CBC AUTOMATED: CPT

## 2024-07-29 PROCEDURE — 85379 FIBRIN DEGRADATION QUANT: CPT

## 2024-07-29 RX ORDER — ESCITALOPRAM OXALATE 5 MG/1
10 TABLET ORAL DAILY
Qty: 30 TABLET | Refills: 1 | Status: SHIPPED | OUTPATIENT
Start: 2024-07-29 | End: 2024-10-27

## 2024-07-29 RX ORDER — ALPRAZOLAM 0.25 MG/1
0.25 TABLET ORAL NIGHTLY PRN
Qty: 7 TABLET | Refills: 0 | Status: SHIPPED | OUTPATIENT
Start: 2024-07-29

## 2024-07-29 NOTE — PROGRESS NOTES
"Subjective   Patient ID: Elmira Harrison is a 29 y.o. female who presents for Breast Pain (Sharp pain in left armpit into left breast-x 1month/Had miscarriage 4/5 weeks ago.) and Hypertension (Headaches, dizziness- has been higher the last few weeks. ).    HPI    Miscarriage 5 weeks ago  Light menstrual cycle x 1 a few week ago  Followed by Dr. Turner and seems to be physically healing well    Now with BP readings elevated at work  Some dizziness episodes, feeling light headed.  Chest pain off and on - now lasting all day  No significant exertional SOB but she does report some difficulty \"taking in a big breath\"    She is struggling with dealing with the emotional aftermath of miscarriage.  Increased her Escitalopram to 10 mg and tolerating well.  She has had some panic attacks and tearful episodes.    She had tried Hydroxyzine but made her too sleepy that she could not function well at work.    Propranolol has helped with situational symptoms off and on.    She is not currently in counseling - stopped due to cost and coverage.        Review of Systems    Review of Systems negative except as noted in HPI and Chief complaint.     Objective             VITALS:  /88 (BP Location: Left arm, Patient Position: Sitting, BP Cuff Size: Adult)   Pulse 88   LMP 07/04/2024 (Approximate)   SpO2 98%      Physical Exam  Constitutional:       General: She is not in acute distress.     Appearance: Normal appearance. She is not ill-appearing.   HENT:      Head: Normocephalic and atraumatic.   Neck:      Vascular: No carotid bruit.   Cardiovascular:      Rate and Rhythm: Normal rate and regular rhythm.      Pulses: Normal pulses.      Heart sounds: Normal heart sounds. No murmur heard.     No gallop.   Pulmonary:      Effort: Pulmonary effort is normal.      Breath sounds: Normal breath sounds. No wheezing, rhonchi or rales.   Musculoskeletal:      Cervical back: Normal range of motion and neck supple. No rigidity or " tenderness.   Lymphadenopathy:      Cervical: No cervical adenopathy.   Skin:     General: Skin is warm and dry.   Neurological:      Mental Status: She is alert.   Psychiatric:         Mood and Affect: Mood normal.         Behavior: Behavior normal.         Assessment/Plan   Problem List Items Addressed This Visit       Anxiety     Continue with increased Escitalopram dosage of 10 mg.  Discussed counseling vs. Referral to Behavioral Health Collaborative Care Team   I have discussed the collaborative care model for this patient's behavioral health care.  Written detailed information and identifying the members of this care team was provided to patient.  They give permission for the Behavioral Health Manager (BHM) and psychiatric consultant to be included in their care with my continued primary management.  Patient made aware that services provided as part of the Collaborative Care Model are subject to insurance billing.           Relevant Medications    ALPRAZolam (Xanax) 0.25 mg tablet    escitalopram (Lexapro) 5 mg tablet     Other Visit Diagnoses       Chest pain on breathing    -  Primary    Checking labs - consider CT angio for possible PE.    Relevant Orders    D-dimer, quantitative    CBC    Grief reaction                FOLLOW UP 3 MONTHS, SOONER WITH ANY PROBLEMS OR CONCERNS.

## 2024-07-30 LAB
D DIMER PPP FEU-MCNC: <215 NG/ML FEU
ERYTHROCYTE [DISTWIDTH] IN BLOOD BY AUTOMATED COUNT: 11.8 % (ref 11.5–14.5)
HCT VFR BLD AUTO: 37.8 % (ref 36–46)
HGB BLD-MCNC: 12.6 G/DL (ref 12–16)
MCH RBC QN AUTO: 29.6 PG (ref 26–34)
MCHC RBC AUTO-ENTMCNC: 33.3 G/DL (ref 32–36)
MCV RBC AUTO: 89 FL (ref 80–100)
NRBC BLD-RTO: 0 /100 WBCS (ref 0–0)
PLATELET # BLD AUTO: 306 X10*3/UL (ref 150–450)
RBC # BLD AUTO: 4.25 X10*6/UL (ref 4–5.2)
WBC # BLD AUTO: 9.3 X10*3/UL (ref 4.4–11.3)

## 2024-07-30 NOTE — ASSESSMENT & PLAN NOTE
Continue with increased Escitalopram dosage of 10 mg.  Discussed counseling vs. Referral to Behavioral Health Collaborative Care Team   I have discussed the collaborative care model for this patient's behavioral health care.  Written detailed information and identifying the members of this care team was provided to patient.  They give permission for the Behavioral Health Manager (BHM) and psychiatric consultant to be included in their care with my continued primary management.  Patient made aware that services provided as part of the Collaborative Care Model are subject to insurance billing.

## 2024-08-01 ENCOUNTER — PATIENT MESSAGE (OUTPATIENT)
Dept: PRIMARY CARE | Facility: CLINIC | Age: 29
End: 2024-08-01
Payer: COMMERCIAL

## 2024-08-12 ENCOUNTER — APPOINTMENT (OUTPATIENT)
Dept: PRIMARY CARE | Facility: CLINIC | Age: 29
End: 2024-08-12
Payer: COMMERCIAL

## 2024-08-12 DIAGNOSIS — F41.9 ANXIETY: ICD-10-CM

## 2024-08-12 DIAGNOSIS — F43.21 GRIEF REACTION: ICD-10-CM

## 2024-08-12 ASSESSMENT — PATIENT HEALTH QUESTIONNAIRE - PHQ9
6. FEELING BAD ABOUT YOURSELF - OR THAT YOU ARE A FAILURE OR HAVE LET YOURSELF OR YOUR FAMILY DOWN: MORE THAN HALF THE DAYS
4. FEELING TIRED OR HAVING LITTLE ENERGY: NEARLY EVERY DAY
3. TROUBLE FALLING OR STAYING ASLEEP: NEARLY EVERY DAY
10. IF YOU CHECKED OFF ANY PROBLEMS, HOW DIFFICULT HAVE THESE PROBLEMS MADE IT FOR YOU TO DO YOUR WORK, TAKE CARE OF THINGS AT HOME, OR GET ALONG WITH OTHER PEOPLE: VERY DIFFICULT
1. LITTLE INTEREST OR PLEASURE IN DOING THINGS: MORE THAN HALF THE DAYS
8. MOVING OR SPEAKING SO SLOWLY THAT OTHER PEOPLE COULD HAVE NOTICED. OR THE OPPOSITE, BEING SO FIGETY OR RESTLESS THAT YOU HAVE BEEN MOVING AROUND A LOT MORE THAN USUAL: MORE THAN HALF THE DAYS
SUM OF ALL RESPONSES TO PHQ QUESTIONS 1-9: 17
5. POOR APPETITE OR OVEREATING: MORE THAN HALF THE DAYS
7. TROUBLE CONCENTRATING ON THINGS, SUCH AS READING THE NEWSPAPER OR WATCHING TELEVISION: SEVERAL DAYS
9. THOUGHTS THAT YOU WOULD BE BETTER OFF DEAD, OR OF HURTING YOURSELF: NOT AT ALL
2. FEELING DOWN, DEPRESSED OR HOPELESS: MORE THAN HALF THE DAYS
SUM OF ALL RESPONSES TO PHQ9 QUESTIONS 1 & 2: 4

## 2024-08-12 ASSESSMENT — ANXIETY QUESTIONNAIRES
6. BECOMING EASILY ANNOYED OR IRRITABLE: MORE THAN HALF THE DAYS
7. FEELING AFRAID AS IF SOMETHING AWFUL MIGHT HAPPEN: MORE THAN HALF THE DAYS
2. NOT BEING ABLE TO STOP OR CONTROL WORRYING: NEARLY EVERY DAY
4. TROUBLE RELAXING: MORE THAN HALF THE DAYS
3. WORRYING TOO MUCH ABOUT DIFFERENT THINGS: MORE THAN HALF THE DAYS
GAD7 TOTAL SCORE: 15
1. FEELING NERVOUS, ANXIOUS, OR ON EDGE: NEARLY EVERY DAY
IF YOU CHECKED OFF ANY PROBLEMS ON THIS QUESTIONNAIRE, HOW DIFFICULT HAVE THESE PROBLEMS MADE IT FOR YOU TO DO YOUR WORK, TAKE CARE OF THINGS AT HOME, OR GET ALONG WITH OTHER PEOPLE: VERY DIFFICULT
5. BEING SO RESTLESS THAT IT IS HARD TO SIT STILL: SEVERAL DAYS
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
## 2024-08-15 ENCOUNTER — DOCUMENTATION (OUTPATIENT)
Dept: BEHAVIORAL HEALTH | Facility: CLINIC | Age: 29
End: 2024-08-15
Payer: COMMERCIAL

## 2024-08-15 NOTE — Clinical Note
For Elmira, she started on Lexapro 10mg po daily about 2.5 weeks ago. Based on her continued depressive and anxiety symtpoms, at 3-4 weeks can increase to 20mg po daily to continue to target depressive sx if she is still struggling a lot. Discuss potential SE including but not limited to HA, nausea, diarrhea, somnolence, decreased libido, and potential insomnia.  Recommend dietitian referral with her dietary choices and her concerns of overeating.   Marilin has goals of working on emotion identification, communication, and coping skill cultivation with her.
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
## 2024-08-21 ENCOUNTER — APPOINTMENT (OUTPATIENT)
Dept: PRIMARY CARE | Facility: CLINIC | Age: 29
End: 2024-08-21
Payer: COMMERCIAL

## 2024-08-21 NOTE — PROGRESS NOTES
Collaborative Care ([facility])  Progress Note    Type of Interaction: Virtual    Start Time: 12:30 PM    End Time: 12:56 PM    Appointment: Scheduled    Reason for Visit:   Chief Complaint   Patient presents with    Follow-up      Interval History / Patient Symptoms:   Subjective   Elmira Harrison is a 29 y.o. female who presents for follow up for Collaborative Care services.     Patient Health Questionnaire-9 Score: 17 (8/12/2024  2:06 PM)  DENISA-7 Total Score: 15 (8/12/2024  2:05 PM)     Interventions Provided: Treatment Planning    Progress Made: N/A    Response to Intervention:  We discussed the following elements from the last appointment:   Reviewed consulting psychiatrist recommendations for treatment; patient has  noticed since starting lexapro 10mg, she has had more symptoms of IBS-C and has been holding a lot of water in her abdomen- feels it is making her feel sluggish.  Also notices that she is breaking out and has a lot of acne; would like to have a hormone panel completed.  Also would like to have her thyroid labs drawn.  She is unsure if these are issues that may be factoring into her symptoms and if there is anything in addition to lexapro to help with these symptoms, as it has been affecting her mood.    Patient feels that some days, she thinks her lexapro is working well, and other days when she has constant worry, but does not want to increase the lexapro yet, in case it is causing her IBS-C.  [facility] recommended that patient schedule a follow up with PCP to discuss hormones and thyroid levels.    Patient is agreeable to treatment recommendations for therapy- including possible referral to an outside therapist for EMDR for the trauma that she has experienced.      Plan:   Advised to make appointment due to IBS symptoms and hormone work and thyroid  [facility] will provide patient with options for EMDR therapists near her home and work at next appointment.    Follow Up / Next Appointment: Next appointment: 08/26/24

## 2024-08-26 ENCOUNTER — APPOINTMENT (OUTPATIENT)
Dept: PRIMARY CARE | Facility: CLINIC | Age: 29
End: 2024-08-26
Payer: COMMERCIAL

## 2024-08-26 ASSESSMENT — ANXIETY QUESTIONNAIRES
1. FEELING NERVOUS, ANXIOUS, OR ON EDGE: MORE THAN HALF THE DAYS
4. TROUBLE RELAXING: MORE THAN HALF THE DAYS
6. BECOMING EASILY ANNOYED OR IRRITABLE: MORE THAN HALF THE DAYS
5. BEING SO RESTLESS THAT IT IS HARD TO SIT STILL: MORE THAN HALF THE DAYS
3. WORRYING TOO MUCH ABOUT DIFFERENT THINGS: MORE THAN HALF THE DAYS
2. NOT BEING ABLE TO STOP OR CONTROL WORRYING: NEARLY EVERY DAY
IF YOU CHECKED OFF ANY PROBLEMS ON THIS QUESTIONNAIRE, HOW DIFFICULT HAVE THESE PROBLEMS MADE IT FOR YOU TO DO YOUR WORK, TAKE CARE OF THINGS AT HOME, OR GET ALONG WITH OTHER PEOPLE: VERY DIFFICULT
7. FEELING AFRAID AS IF SOMETHING AWFUL MIGHT HAPPEN: MORE THAN HALF THE DAYS
GAD7 TOTAL SCORE: 15

## 2024-08-26 ASSESSMENT — PATIENT HEALTH QUESTIONNAIRE - PHQ9
10. IF YOU CHECKED OFF ANY PROBLEMS, HOW DIFFICULT HAVE THESE PROBLEMS MADE IT FOR YOU TO DO YOUR WORK, TAKE CARE OF THINGS AT HOME, OR GET ALONG WITH OTHER PEOPLE: VERY DIFFICULT
SUM OF ALL RESPONSES TO PHQ QUESTIONS 1-9: 18
7. TROUBLE CONCENTRATING ON THINGS, SUCH AS READING THE NEWSPAPER OR WATCHING TELEVISION: MORE THAN HALF THE DAYS
9. THOUGHTS THAT YOU WOULD BE BETTER OFF DEAD, OR OF HURTING YOURSELF: NOT AT ALL
8. MOVING OR SPEAKING SO SLOWLY THAT OTHER PEOPLE COULD HAVE NOTICED. OR THE OPPOSITE, BEING SO FIGETY OR RESTLESS THAT YOU HAVE BEEN MOVING AROUND A LOT MORE THAN USUAL: MORE THAN HALF THE DAYS
2. FEELING DOWN, DEPRESSED OR HOPELESS: MORE THAN HALF THE DAYS
1. LITTLE INTEREST OR PLEASURE IN DOING THINGS: MORE THAN HALF THE DAYS
5. POOR APPETITE OR OVEREATING: NEARLY EVERY DAY
4. FEELING TIRED OR HAVING LITTLE ENERGY: NEARLY EVERY DAY
6. FEELING BAD ABOUT YOURSELF - OR THAT YOU ARE A FAILURE OR HAVE LET YOURSELF OR YOUR FAMILY DOWN: SEVERAL DAYS
3. TROUBLE FALLING OR STAYING ASLEEP: NEARLY EVERY DAY
SUM OF ALL RESPONSES TO PHQ9 QUESTIONS 1 & 2: 4

## 2024-08-26 NOTE — PROGRESS NOTES
Collaborative Care ([facility])  Progress Note    Type of Interaction: In Office    Start Time: 3:05 PM    End Time: 4:00 PM    Appointment: Scheduled    Reason for Visit:   Chief Complaint   Patient presents with    Follow-up      Interval History / Patient Symptoms:   Subjective   Elmira Harrison is a 29 y.o. female who presents for follow up for Collaborative Care services.     Patient Health Questionnaire-9 Score: 18 (8/26/2024  3:08 PM)  DENISA-7 Total Score: 15 (8/26/2024  3:07 PM)    Interventions Provided: Behavior Activation, CBT, Strengths exploration    Progress Made: Slight    Response to Intervention:  We discussed the following elements from the last appointment:   Discussed patient's current stressors, view of self/self-esteem, and educated on SMART goals.    Current stressors include living situation- still living with mom but wants to move out on her own; puts a lot of pressure on self that she isn't living on her own; relationship with her son's dad- and what their future holds; family stressors- mom will disregard her wishes for her son.    Discussed importance of stabilization on medication and working on specific, measurable, attainable, realistic and within a reasonable amount of time to complete.  Patient was able to explore what that looks like and how to complete.  Encouraged patient to start thinking about SMART goals, then lead to BA.    Plan:   SMART goal- implement over the next week      Follow Up / Next Appointment: Next appointment: 09/03/24

## 2024-08-30 ENCOUNTER — DOCUMENTATION (OUTPATIENT)
Dept: PRIMARY CARE | Facility: CLINIC | Age: 29
End: 2024-08-30
Payer: COMMERCIAL

## 2024-08-30 DIAGNOSIS — F41.9 ANXIETY: Primary | ICD-10-CM

## 2024-09-03 ENCOUNTER — APPOINTMENT (OUTPATIENT)
Dept: PRIMARY CARE | Facility: CLINIC | Age: 29
End: 2024-09-03
Payer: COMMERCIAL

## 2024-09-03 NOTE — PROGRESS NOTES
Collaborative Care ([facility])  Progress Note    Type of Interaction: Virtual    Start Time: 12:02 PM    End Time: 12:48 PM    Appointment: Scheduled    Reason for Visit:   Chief Complaint   Patient presents with    Follow-up       Interval History / Patient Symptoms:   Elmira Harrison is a 29 y.o. female currently enrolled in the [facility] program for symptom monitoring, brief therapy, and support. Patient presents today for follow up for Collaborative Care services.     Symptom Monitoring:   Pt c/o continuing anxiety with little relief from medication    Metrics:  Patient Health Questionnaire-9 Score: 18 (8/26/2024  3:08 PM)  DENISA-7 Total Score: 15 (8/26/2024  3:07 PM)     Interventions Provided:   CBT    Progress Made:   Slight    Response to Intervention:  We discussed the following elements during appointment:   Discussed implementation of SMART goals since last appointment- did set some small goals- helped when she did it immediately or else forgot about the task needing done    Explored patient's mental health needs/struggles currently and solutions:  Physical appearance- perception of self and how she feels- long term goal  Acne from self reported hormonal imbalances- 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    Doesn't want to socialize with people really close with- avoids and stays in car    Ideas to try to implement- walking, little goals like meditating or yoga    Patient was engaged, responsive, and interactive.     Plan:   Resources Provided:  EMDR therapists- psychology today or cultivate counseling; Dr Mccartney- schedule an appointment- patient does not want to increase lexapro until getting more labwork done  Goal: start off with walking at lunch- 2x/week  Quotes to remind self of  good    Follow Up / Next Appointment: Next appointment: 09/16/24

## 2024-09-16 ENCOUNTER — APPOINTMENT (OUTPATIENT)
Dept: PRIMARY CARE | Facility: CLINIC | Age: 29
End: 2024-09-16
Payer: COMMERCIAL

## 2024-09-23 ENCOUNTER — APPOINTMENT (OUTPATIENT)
Dept: PRIMARY CARE | Facility: CLINIC | Age: 29
End: 2024-09-23
Payer: COMMERCIAL

## 2024-09-23 ENCOUNTER — OFFICE VISIT (OUTPATIENT)
Dept: PRIMARY CARE | Facility: CLINIC | Age: 29
End: 2024-09-23
Payer: COMMERCIAL

## 2024-09-23 ENCOUNTER — LAB (OUTPATIENT)
Dept: LAB | Facility: LAB | Age: 29
End: 2024-09-23
Payer: COMMERCIAL

## 2024-09-23 ENCOUNTER — HOSPITAL ENCOUNTER (OUTPATIENT)
Dept: RADIOLOGY | Facility: CLINIC | Age: 29
Discharge: HOME | End: 2024-09-23
Payer: COMMERCIAL

## 2024-09-23 VITALS
SYSTOLIC BLOOD PRESSURE: 120 MMHG | WEIGHT: 120.6 LBS | BODY MASS INDEX: 23.55 KG/M2 | HEART RATE: 89 BPM | DIASTOLIC BLOOD PRESSURE: 80 MMHG | OXYGEN SATURATION: 98 %

## 2024-09-23 DIAGNOSIS — R10.2 PELVIC PAIN: ICD-10-CM

## 2024-09-23 DIAGNOSIS — R10.84 GENERALIZED ABDOMINAL PAIN: ICD-10-CM

## 2024-09-23 DIAGNOSIS — R10.84 GENERALIZED ABDOMINAL PAIN: Primary | ICD-10-CM

## 2024-09-23 DIAGNOSIS — F41.9 ANXIETY: Primary | ICD-10-CM

## 2024-09-23 DIAGNOSIS — F41.9 ANXIETY: ICD-10-CM

## 2024-09-23 LAB
ALBUMIN SERPL BCP-MCNC: 5.1 G/DL (ref 3.4–5)
ALP SERPL-CCNC: 32 U/L (ref 33–110)
ALT SERPL W P-5'-P-CCNC: 9 U/L (ref 7–45)
ANION GAP SERPL CALC-SCNC: 14 MMOL/L (ref 10–20)
AST SERPL W P-5'-P-CCNC: 12 U/L (ref 9–39)
BILIRUB SERPL-MCNC: 0.6 MG/DL (ref 0–1.2)
BUN SERPL-MCNC: 7 MG/DL (ref 6–23)
CALCIUM SERPL-MCNC: 9.6 MG/DL (ref 8.6–10.6)
CHLORIDE SERPL-SCNC: 104 MMOL/L (ref 98–107)
CO2 SERPL-SCNC: 24 MMOL/L (ref 21–32)
CREAT SERPL-MCNC: 0.66 MG/DL (ref 0.5–1.05)
EGFRCR SERPLBLD CKD-EPI 2021: >90 ML/MIN/1.73M*2
ERYTHROCYTE [DISTWIDTH] IN BLOOD BY AUTOMATED COUNT: 11.8 % (ref 11.5–14.5)
GLUCOSE SERPL-MCNC: 78 MG/DL (ref 74–99)
HCT VFR BLD AUTO: 39.8 % (ref 36–46)
HGB BLD-MCNC: 13.2 G/DL (ref 12–16)
MCH RBC QN AUTO: 29.9 PG (ref 26–34)
MCHC RBC AUTO-ENTMCNC: 33.2 G/DL (ref 32–36)
MCV RBC AUTO: 90 FL (ref 80–100)
NRBC BLD-RTO: 0 /100 WBCS (ref 0–0)
PLATELET # BLD AUTO: 349 X10*3/UL (ref 150–450)
POC APPEARANCE, URINE: CLEAR
POC BILIRUBIN, URINE: NEGATIVE
POC BLOOD, URINE: NEGATIVE
POC COLOR, URINE: YELLOW
POC GLUCOSE, URINE: NEGATIVE MG/DL
POC KETONES, URINE: NEGATIVE MG/DL
POC LEUKOCYTES, URINE: NEGATIVE
POC NITRITE,URINE: NEGATIVE
POC PH, URINE: 6 PH
POC PROTEIN, URINE: NEGATIVE MG/DL
POC SPECIFIC GRAVITY, URINE: 1.01
POC UROBILINOGEN, URINE: 0.2 EU/DL
POTASSIUM SERPL-SCNC: 4 MMOL/L (ref 3.5–5.3)
PREGNANCY TEST URINE, POC: NEGATIVE
PROT SERPL-MCNC: 7.6 G/DL (ref 6.4–8.2)
RBC # BLD AUTO: 4.42 X10*6/UL (ref 4–5.2)
SODIUM SERPL-SCNC: 138 MMOL/L (ref 136–145)
WBC # BLD AUTO: 8.7 X10*3/UL (ref 4.4–11.3)

## 2024-09-23 PROCEDURE — 99214 OFFICE O/P EST MOD 30 MIN: CPT | Performed by: FAMILY MEDICINE

## 2024-09-23 PROCEDURE — A9698 NON-RAD CONTRAST MATERIALNOC: HCPCS | Performed by: FAMILY MEDICINE

## 2024-09-23 PROCEDURE — 85027 COMPLETE CBC AUTOMATED: CPT

## 2024-09-23 PROCEDURE — 36415 COLL VENOUS BLD VENIPUNCTURE: CPT

## 2024-09-23 PROCEDURE — 81025 URINE PREGNANCY TEST: CPT | Performed by: FAMILY MEDICINE

## 2024-09-23 PROCEDURE — 2550000001 HC RX 255 CONTRASTS: Performed by: FAMILY MEDICINE

## 2024-09-23 PROCEDURE — 74176 CT ABD & PELVIS W/O CONTRAST: CPT

## 2024-09-23 PROCEDURE — 74176 CT ABD & PELVIS W/O CONTRAST: CPT | Performed by: RADIOLOGY

## 2024-09-23 PROCEDURE — 80053 COMPREHEN METABOLIC PANEL: CPT

## 2024-09-23 PROCEDURE — 81002 URINALYSIS NONAUTO W/O SCOPE: CPT | Performed by: FAMILY MEDICINE

## 2024-09-23 RX ORDER — ESCITALOPRAM OXALATE 10 MG/1
10 TABLET ORAL DAILY
Qty: 90 TABLET | Refills: 0 | Status: SHIPPED | OUTPATIENT
Start: 2024-09-23 | End: 2024-12-22

## 2024-09-23 RX ORDER — ALPRAZOLAM 0.25 MG/1
0.25 TABLET ORAL NIGHTLY PRN
Qty: 7 TABLET | Refills: 0 | Status: SHIPPED | OUTPATIENT
Start: 2024-09-23

## 2024-09-23 RX ORDER — ALPRAZOLAM 0.25 MG/1
0.25 TABLET ORAL NIGHTLY PRN
Qty: 7 TABLET | Refills: 0 | Status: CANCELLED | OUTPATIENT
Start: 2024-09-23

## 2024-09-23 ASSESSMENT — PATIENT HEALTH QUESTIONNAIRE - PHQ9
6. FEELING BAD ABOUT YOURSELF - OR THAT YOU ARE A FAILURE OR HAVE LET YOURSELF OR YOUR FAMILY DOWN: SEVERAL DAYS
5. POOR APPETITE OR OVEREATING: NEARLY EVERY DAY
4. FEELING TIRED OR HAVING LITTLE ENERGY: NEARLY EVERY DAY
SUM OF ALL RESPONSES TO PHQ QUESTIONS 1-9: 18
3. TROUBLE FALLING OR STAYING ASLEEP OR SLEEPING TOO MUCH: NEARLY EVERY DAY
10. IF YOU CHECKED OFF ANY PROBLEMS, HOW DIFFICULT HAVE THESE PROBLEMS MADE IT FOR YOU TO DO YOUR WORK, TAKE CARE OF THINGS AT HOME, OR GET ALONG WITH OTHER PEOPLE: VERY DIFFICULT
SUM OF ALL RESPONSES TO PHQ9 QUESTIONS 1 AND 2: 4
7. TROUBLE CONCENTRATING ON THINGS, SUCH AS READING THE NEWSPAPER OR WATCHING TELEVISION: MORE THAN HALF THE DAYS
9. THOUGHTS THAT YOU WOULD BE BETTER OFF DEAD, OR OF HURTING YOURSELF: NOT AT ALL
1. LITTLE INTEREST OR PLEASURE IN DOING THINGS: MORE THAN HALF THE DAYS
8. MOVING OR SPEAKING SO SLOWLY THAT OTHER PEOPLE COULD HAVE NOTICED. OR THE OPPOSITE, BEING SO FIGETY OR RESTLESS THAT YOU HAVE BEEN MOVING AROUND A LOT MORE THAN USUAL: MORE THAN HALF THE DAYS
2. FEELING DOWN, DEPRESSED OR HOPELESS: MORE THAN HALF THE DAYS

## 2024-09-23 ASSESSMENT — ANXIETY QUESTIONNAIRES
7. FEELING AFRAID AS IF SOMETHING AWFUL MIGHT HAPPEN: MORE THAN HALF THE DAYS
6. BECOMING EASILY ANNOYED OR IRRITABLE: NEARLY EVERY DAY
5. BEING SO RESTLESS THAT IT IS HARD TO SIT STILL: MORE THAN HALF THE DAYS
4. TROUBLE RELAXING: MORE THAN HALF THE DAYS
1. FEELING NERVOUS, ANXIOUS, OR ON EDGE: NEARLY EVERY DAY
1. FEELING NERVOUS, ANXIOUS, OR ON EDGE: NEARLY EVERY DAY
IF YOU CHECKED OFF ANY PROBLEMS ON THIS QUESTIONNAIRE, HOW DIFFICULT HAVE THESE PROBLEMS MADE IT FOR YOU TO DO YOUR WORK, TAKE CARE OF THINGS AT HOME, OR GET ALONG WITH OTHER PEOPLE: VERY DIFFICULT
3. WORRYING TOO MUCH ABOUT DIFFERENT THINGS: MORE THAN HALF THE DAYS
4. TROUBLE RELAXING: MORE THAN HALF THE DAYS
GAD7 TOTAL SCORE: 16
2. NOT BEING ABLE TO STOP OR CONTROL WORRYING: MORE THAN HALF THE DAYS
6. BECOMING EASILY ANNOYED OR IRRITABLE: NEARLY EVERY DAY
GAD7 TOTAL SCORE: 16
7. FEELING AFRAID AS IF SOMETHING AWFUL MIGHT HAPPEN: MORE THAN HALF THE DAYS
IF YOU CHECKED OFF ANY PROBLEMS ON THIS QUESTIONNAIRE, HOW DIFFICULT HAVE THESE PROBLEMS MADE IT FOR YOU TO DO YOUR WORK, TAKE CARE OF THINGS AT HOME, OR GET ALONG WITH OTHER PEOPLE: VERY DIFFICULT
3. WORRYING TOO MUCH ABOUT DIFFERENT THINGS: MORE THAN HALF THE DAYS
2. NOT BEING ABLE TO STOP OR CONTROL WORRYING: MORE THAN HALF THE DAYS
5. BEING SO RESTLESS THAT IT IS HARD TO SIT STILL: MORE THAN HALF THE DAYS

## 2024-09-23 NOTE — PROGRESS NOTES
Subjective   Patient ID: Elmira Harrison is a 29 y.o. female who presents for Follow-up (Anxiety, panic attacks- seems to be getting worse, feels as the medicaiton is not working), Abdominal Pain (Stomach pain, diarrhea left side pain under rib cage that has been going on since Friday.), and Amenorrhea (Was suppose to start her period last weekend, but has not started yet, she states she has taken at home pregnancy tests and it has bee negative. ).    HPI    ANXIETY: still having panic attacks  Had 2 last night when she was trying to fall asleep  Used 1/2 alprazolam - had not refilled since her last visit  This morning took 1/2 Alprazolam and Propranolol which seemed to help    Tolerating Escitalopram without difficulty - anxiety not much improved.  She is followed by our Behavioral Health Collaborative Care Team - has appointment today.       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  S/p cholecystectomy and appendectomy - both without complication    COVID illness  2 weeks ago - no fever or chills, no GI upset at that time.    Review of Systems    Review of Systems negative except as noted in HPI and Chief complaint.     Objective         DENISA-7 Total Score: 16     VITALS:  /80 (BP Location: Left arm, Patient Position: Sitting, BP Cuff Size: Adult)   Pulse 89   Wt 54.7 kg (120 lb 9.6 oz)   LMP  (LMP Unknown)   SpO2 98%   BMI 23.55 kg/m²      Physical Exam  Constitutional:       General: She is not in acute distress.      Appearance: Normal appearance. She is not ill-appearing.   HENT:      Head: Normocephalic and atraumatic.   Neck:      Vascular: No carotid bruit.   Cardiovascular:      Rate and Rhythm: Normal rate and regular rhythm.      Pulses: Normal pulses.      Heart sounds: Normal heart sounds. No murmur heard.     No gallop.   Pulmonary:      Effort: Pulmonary effort is normal.      Breath sounds: Normal breath sounds. No wheezing, rhonchi or rales.   Abdominal:      General: Bowel sounds are normal. There is no distension.      Palpations: Abdomen is soft.      Tenderness: There is abdominal tenderness. There is no right CVA tenderness, left CVA tenderness, guarding or rebound.      Comments: Tenderness to palpation in epigastric area - no guarding or rebound  RLQ pain with deep palpation - no rebound, rigidity or rebound.   Musculoskeletal:      Cervical back: Normal range of motion and neck supple. No rigidity or tenderness.   Lymphadenopathy:      Cervical: No cervical adenopathy.   Skin:     General: Skin is warm and dry.   Neurological:      Mental Status: She is alert.   Psychiatric:         Mood and Affect: Mood normal.         Behavior: Behavior normal.         Assessment/Plan   Problem List Items Addressed This Visit       Anxiety     Increasing Escitalopram to 10 mg.  Continue with Behavioral Health Collaborative Care Team sessions - follow up 4 weeks for recheck ,sooner with any problems or concerns.         Relevant Medications    escitalopram (Lexapro) 10 mg tablet    ALPRAZolam (Xanax) 0.25 mg tablet     Other Visit Diagnoses       Generalized abdominal pain    -  Primary    Relevant Orders    CBC (Completed)    Comprehensive metabolic panel (Completed)    CT abdomen and pelvis w oral contrast only (Completed)    Pelvic pain        Relevant Orders    CBC (Completed)    Comprehensive metabolic panel (Completed)    CT abdomen and pelvis w oral contrast only (Completed)    POCT UA (nonautomated) manually  resulted (Completed)    POCT pregnancy, urine manually resulted (Completed)            FOLLOW UP PRN, pending review of results.

## 2024-09-23 NOTE — PROGRESS NOTES
Collaborative Care ([facility])  Progress Note    Type of Interaction: In Office    Start Time: 12:03 PM    End Time: 12:48 PM    Appointment: Scheduled    Reason for Visit:   Chief Complaint   Patient presents with    Follow-up       Interval History / Patient Symptoms:   Elmira Harrison is a 29 y.o. female currently enrolled in the [facility] program for symptom monitoring, brief therapy, and support. Patient presents today for follow up for Collaborative Care services.     Metrics:  Patient Health Questionnaire-9 Score: 18 (9/23/2024 10:35 AM)  DENISA-7 Total Score: 16 (9/23/2024 12:05 PM)    Interventions Provided:   Psychoeducation    Progress Made:   Slight    Response to Intervention:  We discussed the following elements during appointment:   Panic attacks last night- started as she was trying to go to sleep- very restless, continued until about 2:00am- took alprazolam and propranolol and fell asleep.  Has some fear of falling asleep, but unsure if it is due to death/dying or something else.  Pt cannot identify a trigger to her panic attacks but body reactions are intense.    Saw PCP prior to this appointment and is going to increase her lexapro to 10mg- hoping it helps to take the edge off of her symptoms. Does note less irritability overall.    Discussed normal body reactions with anxiety- and when feeling anxious, reminding self that some of these feelings are related to anxiety- patient acknowledges she makes the symptoms more intense by thinking about them.    Patient did look for someone to do EMDR therapy with- found someone in [place] and was going to call today. The therapist is holistic and also does acupuncture treatment as well.  Also discussed acupuncture through [facility].  Patient was going to check about going through  for acupuncture.    While waiting for patient to get linked to a therapist for EMDR/trauma therapy, patient and [facility] are going to start exploring patient's trauma by  having patient write her trauma narrative.  Patient stated that the event that comes into mind is when her brother, who is 13 years older, left the house and how it affected her.    Patient was engaged, responsive, and interactive.     Plan:   Patient to call and schedule an appointment with therapist for EMDR  Patient to start trauma narrative to share at next appointment  Patient to check into [facility] [place] 2[facility] for acupuncture  Increase of medication to 10mg lexapro per PCP appointment    Follow Up / Next Appointment: Next appointment: 10/02/24

## 2024-09-26 NOTE — ASSESSMENT & PLAN NOTE
Increasing Escitalopram to 10 mg.  Continue with Behavioral Health Collaborative Care Team sessions - follow up 4 weeks for recheck ,sooner with any problems or concerns.

## 2024-09-30 ENCOUNTER — DOCUMENTATION (OUTPATIENT)
Dept: PRIMARY CARE | Facility: CLINIC | Age: 29
End: 2024-09-30
Payer: COMMERCIAL

## 2024-09-30 DIAGNOSIS — F41.9 ANXIETY: Primary | ICD-10-CM

## 2024-09-30 PROCEDURE — 99494 1ST/SBSQ PSYC COLLAB CARE: CPT | Performed by: FAMILY MEDICINE

## 2024-09-30 PROCEDURE — 99493 SBSQ PSYC COLLAB CARE MGMT: CPT | Performed by: FAMILY MEDICINE

## 2024-10-02 ENCOUNTER — APPOINTMENT (OUTPATIENT)
Dept: PRIMARY CARE | Facility: CLINIC | Age: 29
End: 2024-10-02
Payer: COMMERCIAL

## 2024-10-08 ENCOUNTER — PATIENT MESSAGE (OUTPATIENT)
Dept: PRIMARY CARE | Facility: CLINIC | Age: 29
End: 2024-10-08
Payer: COMMERCIAL

## 2024-10-08 DIAGNOSIS — F41.9 ANXIETY: ICD-10-CM

## 2024-10-08 RX ORDER — PROPRANOLOL HYDROCHLORIDE 20 MG/1
20 TABLET ORAL 2 TIMES DAILY PRN
Qty: 60 TABLET | Refills: 0 | Status: SHIPPED | OUTPATIENT
Start: 2024-10-08 | End: 2025-04-06

## 2024-10-14 ENCOUNTER — APPOINTMENT (OUTPATIENT)
Dept: PRIMARY CARE | Facility: CLINIC | Age: 29
End: 2024-10-14
Payer: COMMERCIAL

## 2024-10-16 ENCOUNTER — PATIENT OUTREACH (OUTPATIENT)
Dept: HEMATOLOGY/ONCOLOGY | Facility: HOSPITAL | Age: 29
End: 2024-10-16
Payer: COMMERCIAL

## 2024-10-16 NOTE — PROGRESS NOTES
RN talked to patient. Patient states that she was diagnosed with Factor V Leiden when she was in High school. She had a DVT at that time. She is not currently on anticoagulation. She has a 7 year old son and is worried that he might have it as well. Patient also mentioned that she had a miscarriage and the OB is wondering if the clotting disorder is the reason. She says that the testing was done by a doctor at [facility]. RN will try to get the records. Patient aware of date, time and place. Call back number reviewed. An email was sent to patient with directions to the appointment.    Clindamycin Counseling: I counseled the patient regarding use of clindamycin as an antibiotic for prophylactic and/or therapeutic purposes. Clindamycin is active against numerous classes of bacteria, including skin bacteria. Side effects may include nausea, diarrhea, gastrointestinal upset, rash, hives, yeast infections, and in rare cases, colitis.

## 2024-10-20 ENCOUNTER — HOSPITAL ENCOUNTER (EMERGENCY)
Age: 29
Discharge: HOME | End: 2024-10-20
Attending: EMERGENCY MEDICINE
Payer: COMMERCIAL

## 2024-10-20 ENCOUNTER — APPOINTMENT (OUTPATIENT)
Dept: GENERAL RADIOLOGY | Age: 29
End: 2024-10-20
Payer: COMMERCIAL

## 2024-10-20 VITALS
OXYGEN SATURATION: 100 % | RESPIRATION RATE: 16 BRPM | HEIGHT: 60 IN | BODY MASS INDEX: 23.56 KG/M2 | HEART RATE: 55 BPM | DIASTOLIC BLOOD PRESSURE: 68 MMHG | TEMPERATURE: 97.7 F | SYSTOLIC BLOOD PRESSURE: 129 MMHG | WEIGHT: 120 LBS

## 2024-10-20 DIAGNOSIS — N30.00 ACUTE CYSTITIS WITHOUT HEMATURIA: ICD-10-CM

## 2024-10-20 DIAGNOSIS — R42 LIGHTHEADEDNESS: Primary | ICD-10-CM

## 2024-10-20 LAB
ALBUMIN SERPL-MCNC: 4.7 G/DL (ref 3.5–5.2)
ALP SERPL-CCNC: 40 U/L (ref 35–104)
ALT SERPL-CCNC: 8 U/L (ref 0–32)
ANION GAP SERPL CALCULATED.3IONS-SCNC: 10 MMOL/L (ref 7–16)
AST SERPL-CCNC: 16 U/L (ref 0–31)
BASOPHILS # BLD: 0.04 K/UL (ref 0–0.2)
BASOPHILS NFR BLD: 0 % (ref 0–2)
BILIRUB SERPL-MCNC: 0.5 MG/DL (ref 0–1.2)
BILIRUB UR QL STRIP: NEGATIVE
BUN SERPL-MCNC: 9 MG/DL (ref 6–20)
CALCIUM SERPL-MCNC: 9.5 MG/DL (ref 8.6–10.2)
CHLORIDE SERPL-SCNC: 105 MMOL/L (ref 98–107)
CLARITY UR: CLEAR
CO2 SERPL-SCNC: 22 MMOL/L (ref 22–29)
COLOR UR: YELLOW
CREAT SERPL-MCNC: 0.8 MG/DL (ref 0.5–1)
EOSINOPHIL # BLD: 0.06 K/UL (ref 0.05–0.5)
EOSINOPHILS RELATIVE PERCENT: 1 % (ref 0–6)
ERYTHROCYTE [DISTWIDTH] IN BLOOD BY AUTOMATED COUNT: 11.9 % (ref 11.5–15)
GFR, ESTIMATED: >90 ML/MIN/1.73M2
GLUCOSE SERPL-MCNC: 99 MG/DL (ref 74–99)
GLUCOSE UR STRIP-MCNC: NEGATIVE MG/DL
HCG, URINE, POC: NEGATIVE
HCT VFR BLD AUTO: 40.6 % (ref 34–48)
HGB BLD-MCNC: 13.6 G/DL (ref 11.5–15.5)
HGB UR QL STRIP.AUTO: ABNORMAL
IMM GRANULOCYTES # BLD AUTO: 0.03 K/UL (ref 0–0.58)
IMM GRANULOCYTES NFR BLD: 0 % (ref 0–5)
KETONES UR STRIP-MCNC: NEGATIVE MG/DL
LEUKOCYTE ESTERASE UR QL STRIP: ABNORMAL
LYMPHOCYTES NFR BLD: 1.99 K/UL (ref 1.5–4)
LYMPHOCYTES RELATIVE PERCENT: 18 % (ref 20–42)
Lab: NORMAL
MAGNESIUM SERPL-MCNC: 2 MG/DL (ref 1.6–2.6)
MCH RBC QN AUTO: 29.6 PG (ref 26–35)
MCHC RBC AUTO-ENTMCNC: 33.5 G/DL (ref 32–34.5)
MCV RBC AUTO: 88.3 FL (ref 80–99.9)
MONOCYTES NFR BLD: 0.52 K/UL (ref 0.1–0.95)
MONOCYTES NFR BLD: 5 % (ref 2–12)
NEGATIVE QC PASS/FAIL: NORMAL
NEUTROPHILS NFR BLD: 76 % (ref 43–80)
NEUTS SEG NFR BLD: 8.4 K/UL (ref 1.8–7.3)
NITRITE UR QL STRIP: NEGATIVE
PH UR STRIP: 7 [PH] (ref 5–9)
PLATELET # BLD AUTO: 313 K/UL (ref 130–450)
PMV BLD AUTO: 8.8 FL (ref 7–12)
POSITIVE QC PASS/FAIL: NORMAL
POTASSIUM SERPL-SCNC: 3.8 MMOL/L (ref 3.5–5)
PROT SERPL-MCNC: 7.4 G/DL (ref 6.4–8.3)
PROT UR STRIP-MCNC: ABNORMAL MG/DL
RBC # BLD AUTO: 4.6 M/UL (ref 3.5–5.5)
RBC #/AREA URNS HPF: ABNORMAL /HPF
SODIUM SERPL-SCNC: 137 MMOL/L (ref 132–146)
SP GR UR STRIP: <1.005 (ref 1–1.03)
T4 SERPL-MCNC: 8.4 UG/DL (ref 4.5–11.7)
TROPONIN I SERPL HS-MCNC: <6 NG/L (ref 0–9)
TSH SERPL DL<=0.05 MIU/L-ACNC: 0.54 UIU/ML (ref 0.27–4.2)
UROBILINOGEN UR STRIP-ACNC: 0.2 EU/DL (ref 0–1)
WBC #/AREA URNS HPF: ABNORMAL /HPF
WBC OTHER # BLD: 11 K/UL (ref 4.5–11.5)

## 2024-10-20 PROCEDURE — 84436 ASSAY OF TOTAL THYROXINE: CPT

## 2024-10-20 PROCEDURE — 84443 ASSAY THYROID STIM HORMONE: CPT

## 2024-10-20 PROCEDURE — 93005 ELECTROCARDIOGRAM TRACING: CPT

## 2024-10-20 PROCEDURE — 71046 X-RAY EXAM CHEST 2 VIEWS: CPT

## 2024-10-20 PROCEDURE — 80053 COMPREHEN METABOLIC PANEL: CPT

## 2024-10-20 PROCEDURE — 84484 ASSAY OF TROPONIN QUANT: CPT

## 2024-10-20 PROCEDURE — 85025 COMPLETE CBC W/AUTO DIFF WBC: CPT

## 2024-10-20 PROCEDURE — 83735 ASSAY OF MAGNESIUM: CPT

## 2024-10-20 PROCEDURE — 81001 URINALYSIS AUTO W/SCOPE: CPT

## 2024-10-20 PROCEDURE — 99285 EMERGENCY DEPT VISIT HI MDM: CPT

## 2024-10-20 RX ORDER — CEFDINIR 300 MG/1
300 CAPSULE ORAL 2 TIMES DAILY
Qty: 14 CAPSULE | Refills: 0 | Status: SHIPPED | OUTPATIENT
Start: 2024-10-20 | End: 2024-10-27

## 2024-10-20 ASSESSMENT — PAIN - FUNCTIONAL ASSESSMENT: PAIN_FUNCTIONAL_ASSESSMENT: NONE - DENIES PAIN

## 2024-10-20 NOTE — ED PROVIDER NOTES
[facility] EMERGENCY DEPARTMENT  EMERGENCY DEPARTMENT ENCOUNTER        Pt Name: Mikayla Linn  MRN: [id number]  Birthdate 1995  Date of evaluation: 10/20/2024  Provider: Flavia Madrigal DO  PCP: Arlette Angel DO  Note Started: 1:11 PM EDT 10/20/24    CHIEF COMPLAINT       Chief Complaint   Patient presents with    Dizziness     Near syncope.  Dizziness after having a miscarriage a few months ago       HISTORY OF PRESENT ILLNESS: 1 or more Elements   History From: Patient    Limitations to history : None  Social Determinants : None    Mikayla Linn is a 29 y.o. female who presents lightheadedness.  Patient states that she has been having lightheadedness for a few months.  She states today she had 2 episodes that she almost passed out.  She states that she was walking when this happened.  She states she felt better after she sat down.  She states that she felt lightheaded and did not feel well.  She states that she has been started on propranolol due to anxiety and having these episodes every morning and every night for the past few months.  She states this started after she had a miscarriage.  Patient states she recently had outpatient labs and her D-dimer was within normal range.  Denies any fever, chills, n/v, headache, vision changes, neck tenderness or stiffness, weakness, cp, palpitations, leg swelling/tenderness, sob, cough, abd pain, dysuria, hematuria, diarrhea, constipation, bloody stools.    Nursing Notes were all reviewed and agreed with or any disagreements were addressed in the HPI.    ROS:   Pertinent positives and negatives are stated within HPI, all other systems reviewed and are negative.      --------------------------------------------- PAST HISTORY ---------------------------------------------  Past Medical History:  has no past medical history on file.    Past Surgical History:  has no past surgical history on file.    Social History:

## 2024-10-20 NOTE — DISCHARGE INSTRUCTIONS
Please return to the ER for any new or worsening symptoms including but not limited to Numbness or weakness anywhere or blurry or double vision  If prescribed, please be sure to  your prescriptions from the pharmacy  Please follow-up with Primary care provider as instructed

## 2024-10-21 ENCOUNTER — OFFICE VISIT (OUTPATIENT)
Dept: HEMATOLOGY/ONCOLOGY | Facility: HOSPITAL | Age: 29
End: 2024-10-21
Payer: COMMERCIAL

## 2024-10-21 ENCOUNTER — LAB (OUTPATIENT)
Dept: LAB | Facility: HOSPITAL | Age: 29
End: 2024-10-21
Payer: COMMERCIAL

## 2024-10-21 VITALS
HEART RATE: 67 BPM | WEIGHT: 118.3 LBS | TEMPERATURE: 97.2 F | SYSTOLIC BLOOD PRESSURE: 114 MMHG | BODY MASS INDEX: 23.1 KG/M2 | DIASTOLIC BLOOD PRESSURE: 67 MMHG | OXYGEN SATURATION: 100 %

## 2024-10-21 DIAGNOSIS — N96 RECURRENT PREGNANCY LOSS: ICD-10-CM

## 2024-10-21 DIAGNOSIS — D68.2 FACTOR V DEFICIENCY (MULTI): ICD-10-CM

## 2024-10-21 DIAGNOSIS — N92.0 MENORRHAGIA WITH REGULAR CYCLE: ICD-10-CM

## 2024-10-21 DIAGNOSIS — N96 RECURRENT PREGNANCY LOSS: Primary | ICD-10-CM

## 2024-10-21 LAB
B2 GLYCOPROT1 IGA SER-ACNC: <0.6 U/ML
B2 GLYCOPROT1 IGG SER-ACNC: <1.4 U/ML
B2 GLYCOPROT1 IGM SER-ACNC: 1.9 U/ML
CARDIOLIPIN IGA SERPL-ACNC: <0.5 APL U/ML
CARDIOLIPIN IGG SER IA-ACNC: <1.6 GPL U/ML
CARDIOLIPIN IGM SER IA-ACNC: 0.7 MPL U/ML
DRVVT SCREEN TO CONFIRM RATIO: 0.97 RATIO
DRVVT/DRVVT CFM NRMLZD PPP-RTO: 1.01 RATIO
DRVVT/DRVVT CFM P DOAC NEUT NORM PPP-RTO: 0.97 RATIO
EKG ATRIAL RATE: 49 BPM
EKG P AXIS: 44 DEGREES
EKG P-R INTERVAL: 156 MS
EKG Q-T INTERVAL: 440 MS
EKG QRS DURATION: 72 MS
EKG QTC CALCULATION (BAZETT): 397 MS
EKG R AXIS: 58 DEGREES
EKG T AXIS: 47 DEGREES
EKG VENTRICULAR RATE: 49 BPM
FACT VIII ACT/NOR PPP: 95 % (ref 55–180)
FERRITIN SERPL-MCNC: 38 NG/ML (ref 8–150)
HOLD SPECIMEN: NORMAL
HOLD SPECIMEN: NORMAL
IRON SATN MFR SERPL: 33 % (ref 25–45)
IRON SERPL-MCNC: 123 UG/DL (ref 35–150)
LA 2 SCREEN W REFLEX-IMP: NORMAL
NORMALIZED SCT PPP-RTO: 1.06 RATIO
SILICA CLOTTING TIME CONFIRMATION: 0.98 RATIO
SILICA CLOTTING TIME SCREEN: 1.04 RATIO
TIBC SERPL-MCNC: 377 UG/DL (ref 240–445)
UIBC SERPL-MCNC: 254 UG/DL (ref 110–370)
VWF AG ACT/NOR PPP IA: 88 % (ref 50–220)

## 2024-10-21 PROCEDURE — 82728 ASSAY OF FERRITIN: CPT

## 2024-10-21 PROCEDURE — 93010 ELECTROCARDIOGRAM REPORT: CPT | Performed by: INTERNAL MEDICINE

## 2024-10-21 PROCEDURE — 83550 IRON BINDING TEST: CPT

## 2024-10-21 PROCEDURE — 85397 CLOTTING FUNCT ACTIVITY: CPT

## 2024-10-21 PROCEDURE — 36415 COLL VENOUS BLD VENIPUNCTURE: CPT

## 2024-10-21 PROCEDURE — 85246 CLOT FACTOR VIII VW ANTIGEN: CPT

## 2024-10-21 PROCEDURE — 86147 CARDIOLIPIN ANTIBODY EA IG: CPT

## 2024-10-21 PROCEDURE — 99215 OFFICE O/P EST HI 40 MIN: CPT | Performed by: STUDENT IN AN ORGANIZED HEALTH CARE EDUCATION/TRAINING PROGRAM

## 2024-10-21 PROCEDURE — 85240 CLOT FACTOR VIII AHG 1 STAGE: CPT

## 2024-10-21 PROCEDURE — 86146 BETA-2 GLYCOPROTEIN ANTIBODY: CPT

## 2024-10-21 PROCEDURE — 99205 OFFICE O/P NEW HI 60 MIN: CPT | Performed by: STUDENT IN AN ORGANIZED HEALTH CARE EDUCATION/TRAINING PROGRAM

## 2024-10-21 PROCEDURE — 85247 CLOT FACTOR VIII MULTIMETRIC: CPT

## 2024-10-21 PROCEDURE — 85730 THROMBOPLASTIN TIME PARTIAL: CPT

## 2024-10-21 ASSESSMENT — PAIN SCALES - GENERAL: PAINLEVEL_OUTOF10: 0-NO PAIN

## 2024-10-21 NOTE — PROGRESS NOTES
"Patient ID: Elmira Harrison is a 29 y.o. female.  Referring Physician: Jael Mccartney DO  [address]  [place] [address]  Primary Care Provider: Jael Mccartney DO  Visit Type: Initial Visit  Diagnosis: Hypercoagulability       Subjective    HPI  29 y.o. female with a PMH significant for generalized anxiety disorder, bowel malrotation, ?DVT as a teenager.     Re thrombosis history:   Had a new diagnosis of endometriosis, with menorrhagia aged 13yrs, sister was diagnosed with FVL, pt was tested and is negative. After this she had left calf pain, US showed \"per technician\" old blood clot, was not treated with AC and no recurrence.   Mother and pt recall she has FVL mutation.   Testing done prior to this visit shows negative PT mutation and FVL.  No other VTE since.   1st pregnancy, miscarried at 4-5wks, has 1 son 7yrs, no complications with the delivery, most recently was 6-8wks and miscarried. Partner's family has no known genetic abnormalities.   Has 2 half sibs and 2 full sibs, 3 sisters one full sister has FVL, (tested at same time as pt) has had 6 pregnancies and 3 miscarriages. No clotting history venous or arterial.   Father NHL brain . Paternal GF NHL, mat great aunt lymphoma.   Age appropriate cancer screening: no p/h/o cancer, pap last 1yr back WNL, last mammo when she was in high school.   FMH: Has 2 half sibs and 2 full sibs, 3 sisters one full sister has FVL, (tested at same time as pt) has had 6 pregnancies and 3 miscarriages. No clotting history venous or arterial.   Father NHL brain . Paternal GF NHL, mat great Aunt lymphoma.   Social history: former smoker, quit 4yrs back, previously inhaled MJ. Avg week drinks 1 drink maybe.     Review of Systems - Oncology  10 point review of systems negative except as stated in HPI    Objective   Past Surgical History:   Procedure Laterality Date    APPENDECTOMY      CHOLECYSTECTOMY  2016    " Cholecystectomy Laparoscopic    LAPAROSCOPY DIAGNOSTIC / BIOPSY / ASPIRATION / LYSIS  02/22/2016    Exploratory Laparoscopy     Oncology History    No history exists.       BSA: There is no height or weight on file to calculate BSA. There were no vitals taken for this visit.  Physical Exam    Assessment/Plan    29 y.o. female with a PMH significant for generalized anxiety disorder, DVT as a teenager, recurrent pregnancy loss, bowel malrotation.     # Thrombosis risk: questionable h/o DVT as a teenager, was never treated, this does not appear to be a true high risk DVT, FVL, PT mutations are negative. Has however had 2 first trimester pregnancy losses. And sister has had 3. Reasonable to work up for APS syndrome.   Plan:  - APS testing ordered. Discussed with pt about possible outcomes and that she can plan to use estrogen as needed.   - follow up next: TBD  - labs prior to follow up: noted above     # Menorrhagia: chronic, heavy initial days, regular, has endometriosis, with significant pain.   Plan:  - will test for vWD, and iron panel   - follow up next: plan to call pt with results in a month     Juvenal Snow MD     65

## 2024-10-26 ENCOUNTER — PATIENT MESSAGE (OUTPATIENT)
Dept: PRIMARY CARE | Facility: CLINIC | Age: 29
End: 2024-10-26

## 2024-10-26 ENCOUNTER — OFFICE VISIT (OUTPATIENT)
Dept: PRIMARY CARE | Facility: CLINIC | Age: 29
End: 2024-10-26
Payer: COMMERCIAL

## 2024-10-26 VITALS
WEIGHT: 119 LBS | HEART RATE: 60 BPM | BODY MASS INDEX: 23.24 KG/M2 | OXYGEN SATURATION: 100 % | DIASTOLIC BLOOD PRESSURE: 60 MMHG | SYSTOLIC BLOOD PRESSURE: 100 MMHG

## 2024-10-26 DIAGNOSIS — R42 DIZZINESS: Primary | ICD-10-CM

## 2024-10-26 DIAGNOSIS — F41.9 ANXIETY: ICD-10-CM

## 2024-10-26 DIAGNOSIS — G43.E09 CHRONIC MIGRAINE WITH AURA WITHOUT STATUS MIGRAINOSUS, NOT INTRACTABLE: ICD-10-CM

## 2024-10-26 PROBLEM — D68.2 FACTOR V DEFICIENCY (MULTI): Status: RESOLVED | Noted: 2024-06-30 | Resolved: 2024-10-26

## 2024-10-26 PROCEDURE — 1036F TOBACCO NON-USER: CPT | Performed by: FAMILY MEDICINE

## 2024-10-26 PROCEDURE — 99214 OFFICE O/P EST MOD 30 MIN: CPT | Performed by: FAMILY MEDICINE

## 2024-10-26 RX ORDER — ESCITALOPRAM OXALATE 20 MG/1
20 TABLET ORAL DAILY
Qty: 90 TABLET | Refills: 0 | Status: SHIPPED | OUTPATIENT
Start: 2024-10-26 | End: 2025-01-24

## 2024-10-26 RX ORDER — DIAZEPAM 2 MG/1
2 TABLET ORAL 3 TIMES DAILY PRN
Qty: 30 TABLET | Refills: 0 | Status: SHIPPED | OUTPATIENT
Start: 2024-10-26 | End: 2025-01-24

## 2024-10-26 NOTE — PROGRESS NOTES
Subjective   Patient ID: Elmira Harrison is a 29 y.o. female who presents for Blood Pressure Concerns (Low BP, dizziness (feeling of her passing out)- saw neurology- possibly POTS. ).    HPI    DIZZINESS: persists.  Unable to perform all her duties without feeling light headed and has had to lie down at times during the day to help with dizziness  She did see neurology, Dr. Clemens, and they are requesting old records and testing  He would like to repeat some of her testing and ordering MRI    Suspected POTs but wanting to confirm    Advised to increase fluids, increase electrolyte solutions, exercise as tolerated and avoid high carbohydrate loads  She was also started on Ajovy for possible atypical migraine prophylaxis    FMLA possible for work - continuous suggested for now so that she can make her follow up appointments and neurology testing.  To start tentatively 11/11/2024 (will need 8 nava to have approved)  Current position: Medical assistant duties, biopsies, rooming patients   Estimated duration: 8--12 weeks to end 1/6/2025    Menstrual cycles restarted - heavy for 2 days and then some intermittent clotting for 2 days and then stop  LMP last week    ANXIETY: tolerating Escitalopram but still with some breakthrough panic attacks - usually with driving   Has had to pull over a few times to stop, sometimes with racing heart and shaking    Review of Systems    Review of Systems negative except as noted in HPI and Chief complaint.     Objective             VITALS:  /60 (BP Location: Left arm, Patient Position: Sitting, BP Cuff Size: Adult)   Pulse 60   Wt 54 kg (119 lb)   SpO2 100%   BMI 23.24 kg/m²      Physical Exam  Constitutional:       General: She is not in acute distress.     Appearance: Normal appearance. She is not ill-appearing.   HENT:      Head: Normocephalic and atraumatic.   Neck:      Vascular: No carotid bruit.   Cardiovascular:      Rate and Rhythm: Normal rate and regular rhythm.       Pulses: Normal pulses.      Heart sounds: Normal heart sounds. No murmur heard.     No gallop.   Pulmonary:      Effort: Pulmonary effort is normal.      Breath sounds: Normal breath sounds. No wheezing, rhonchi or rales.   Musculoskeletal:      Cervical back: Normal range of motion and neck supple. No rigidity or tenderness.   Lymphadenopathy:      Cervical: No cervical adenopathy.   Skin:     General: Skin is warm and dry.   Neurological:      Mental Status: She is alert.   Psychiatric:         Mood and Affect: Mood normal.         Behavior: Behavior normal.         Assessment/Plan   Problem List Items Addressed This Visit       Anxiety     Continue follow up with Behavioral Health Collaborative Care Team   Increasing Escitalopram to 20 mg daily.  Trial of prn Valium - for acute anxiety attacks as already tried Propranolol.  Follow up 1-2 months.         Relevant Medications    escitalopram (Lexapro) 20 mg tablet    diazePAM (Valium) 2 mg tablet    Chronic migraine with aura without status migrainosus, not intractable     Follow up with neurology as scheduled.          Other Visit Diagnoses       Dizziness    -  Primary    Will fill out FMLA to have furhter testing performed.  Will fax forms to our office.  suspected POTs as working diagnosis.            FOLLOW UP 1 MONTH, SOONER WITH ANY PROBLEMS OR CONCERNS.

## 2024-10-28 ENCOUNTER — APPOINTMENT (OUTPATIENT)
Dept: PRIMARY CARE | Facility: CLINIC | Age: 29
End: 2024-10-28
Payer: COMMERCIAL

## 2024-10-28 DIAGNOSIS — F41.9 ANXIETY: Primary | ICD-10-CM

## 2024-10-28 LAB
VWF GP1BM ACTIVITY: 76 IU/DL (ref 52–180)
VWF MULTIMERS PPP IB: NORMAL

## 2024-10-29 PROBLEM — G43.E09 CHRONIC MIGRAINE WITH AURA WITHOUT STATUS MIGRAINOSUS, NOT INTRACTABLE: Status: ACTIVE | Noted: 2024-10-29

## 2024-10-29 RX ORDER — FREMANEZUMAB-VFRM 225 MG/1.5ML
225 INJECTION SUBCUTANEOUS
COMMUNITY

## 2024-10-29 NOTE — ASSESSMENT & PLAN NOTE
Continue follow up with Behavioral Health Collaborative Care Team   Increasing Escitalopram to 20 mg daily.  Trial of prn Valium - for acute anxiety attacks as already tried Propranolol.  Follow up 1-2 months.

## 2024-10-31 ENCOUNTER — TELEPHONE (OUTPATIENT)
Dept: PRIMARY CARE | Facility: CLINIC | Age: 29
End: 2024-10-31

## 2024-10-31 ENCOUNTER — DOCUMENTATION (OUTPATIENT)
Dept: PRIMARY CARE | Facility: CLINIC | Age: 29
End: 2024-10-31
Payer: COMMERCIAL

## 2024-10-31 DIAGNOSIS — F41.9 ANXIETY: Primary | ICD-10-CM

## 2024-10-31 NOTE — TELEPHONE ENCOUNTER
Patient called in had reaction to the medication you placed her on would like a call or to be seen.  She took half a dose and had the reverse effect not calming but racing feeling, face was hot, had a rash felt like she was taking a pre workout and put her in a panic attack she is not sure if she needs to go back on the Xanax

## 2024-11-01 ENCOUNTER — PATIENT MESSAGE (OUTPATIENT)
Dept: PRIMARY CARE | Facility: CLINIC | Age: 29
End: 2024-11-01
Payer: COMMERCIAL

## 2024-11-01 DIAGNOSIS — F41.9 ANXIETY: Primary | ICD-10-CM

## 2024-11-01 RX ORDER — ALPRAZOLAM 0.25 MG/1
0.25 TABLET ORAL 2 TIMES DAILY PRN
Qty: 14 TABLET | Refills: 0 | Status: SHIPPED | OUTPATIENT
Start: 2024-11-01 | End: 2024-11-08

## 2024-11-11 ENCOUNTER — APPOINTMENT (OUTPATIENT)
Dept: PRIMARY CARE | Facility: CLINIC | Age: 29
End: 2024-11-11
Payer: COMMERCIAL

## 2024-11-15 PROBLEM — R42 DIZZINESS: Status: ACTIVE | Noted: 2024-11-15

## 2024-11-20 ENCOUNTER — APPOINTMENT (OUTPATIENT)
Dept: OBSTETRICS AND GYNECOLOGY | Facility: CLINIC | Age: 29
End: 2024-11-20
Payer: COMMERCIAL

## 2024-11-20 VITALS
DIASTOLIC BLOOD PRESSURE: 74 MMHG | BODY MASS INDEX: 23.71 KG/M2 | SYSTOLIC BLOOD PRESSURE: 102 MMHG | HEIGHT: 60 IN | WEIGHT: 120.8 LBS

## 2024-11-20 DIAGNOSIS — N80.9 ENDOMETRIOSIS: Primary | ICD-10-CM

## 2024-11-20 PROCEDURE — 3008F BODY MASS INDEX DOCD: CPT | Performed by: OBSTETRICS & GYNECOLOGY

## 2024-11-20 PROCEDURE — 99214 OFFICE O/P EST MOD 30 MIN: CPT | Performed by: OBSTETRICS & GYNECOLOGY

## 2024-11-20 PROCEDURE — 1036F TOBACCO NON-USER: CPT | Performed by: OBSTETRICS & GYNECOLOGY

## 2024-11-20 RX ORDER — MEGESTROL ACETATE 20 MG/1
20 TABLET ORAL DAILY
Qty: 60 TABLET | Refills: 0 | Status: SHIPPED | OUTPATIENT
Start: 2024-11-20 | End: 2025-01-19

## 2024-11-20 NOTE — PROGRESS NOTES
Elmira Harrison is a 29 y.o. female who presents with a chief complaint of Consult (Patient had a miscarriage 06/2024 and since then her endometriosis symptoms have gotten worse, has had  surgeries in the past and feels she may need another one. Pain is in bowel , left side and lower back which is causing pain to go down her leg.)      SUBJECTIVE  Patient presents to go over her endometriosis.  She states that since her miscarriage that she has been having a lot more pain than she used to.  She has had documented endometriosis by surgery in the past.  Currently her periods are about 2 days and very heavy with a lot of cramping and pain.  She would like to have surgical intervention and then the state amenorrheic until she is ready to get pregnant again    Past Medical History:   Diagnosis Date    23 weeks gestation of pregnancy ([facility]) 04/07/2017    23 weeks gestation of pregnancy    Acute gastric ulcer without hemorrhage or perforation 12/17/2015    Acute gastric ulcer    Acute upper respiratory infection, unspecified 10/01/2020    Acute upper respiratory infection    Allergic purpura (CMS-HCC) 12/11/2013    Henoch-Schonlein purpura    Anemia     Chest pain, unspecified 12/11/2013    Chest pain    Contusion of unspecified back wall of thorax, initial encounter 12/11/2013    Contusion of back    Edema, unspecified 12/11/2013    Edema    Endometriosis     Fibroid     Gastro-esophageal reflux disease without esophagitis 12/11/2013    Esophageal reflux    Intestinal malabsorption, unspecified 12/11/2013    Malabsorption syndrome    Iron deficiency anemia secondary to blood loss (chronic) 12/11/2013    Iron deficiency anemia due to chronic blood loss    Migraine     Nausea 12/11/2013    Nausea    Nonspecific lymphadenitis, unspecified 12/11/2013    Lymphadenitis    Other conditions influencing health status 12/11/2013    Endomyometritis    Other conditions influencing health status 12/11/2013    Inflammatory Myopathy  (Myositis)    Other conditions influencing health status 2013    Attention-deficit Hyperactivity Disorder    Other conditions influencing health status     History of burning on urination    Other disorders of intestinal carbohydrate absorption 2013    Glucose intolerance (malabsorption)    Other fatigue 2013    Fatigue    Other pulmonary embolism without acute cor pulmonale 2013    Pulmonary embolism    Pain in unspecified shoulder 2013    Pain, joint, shoulder    Personal history of other diseases of the female genital tract 2016    History of endometriosis    Personal history of other diseases of the respiratory system 10/25/2016    History of acute pharyngitis    Personal history of other specified conditions 2016    History of abnormal weight loss    Personal history of other specified conditions 2016    History of fatigue    Phlebitis and thrombophlebitis of unspecified site 2013    Thrombophlebitis    Sciatica, unspecified side 2013    Sciatica    Unspecified abdominal pain 2013    Abdominal pain    Unspecified sprain of unspecified wrist, initial encounter 05/10/2017    Wrist sprain    Zoster without complications 2013    Herpes zoster     Past Surgical History:   Procedure Laterality Date    APPENDECTOMY      CHOLECYSTECTOMY  2016    Cholecystectomy Laparoscopic    LAPAROSCOPY DIAGNOSTIC / BIOPSY / ASPIRATION / LYSIS  2016    Exploratory Laparoscopy     Social History     Socioeconomic History    Marital status: Single   Tobacco Use    Smoking status: Former     Types: Cigarettes    Smokeless tobacco: Never   Vaping Use    Vaping status: Every Day    Substances: Nicotine    Devices: Disposable   Substance and Sexual Activity    Alcohol use: Not Currently    Drug use: Never    Sexual activity: Yes     Partners: Male     Birth control/protection: None     No family history on file.    OB History    Para Term   AB Living   4 1 1 0 3 1   SAB IAB Ectopic Multiple Live Births   1 1 1 0 1      # Outcome Date GA Lbr Wesley/2nd Weight Sex Type Anes PTL Lv   4 SAB 06/2024           3 Ectopic 2019           2 Term 2017    M Vag-Spont   MELONIE   1 IAB                OBJECTIVE  Allergies   Allergen Reactions    Penicillins Rash, Unknown and Nausea/vomiting     Other reaction(s): Unknown    Valium [Diazepam] Hives and Palpitations    Acetaminophen Itching    Metoprolol Hives      (Not in a hospital admission)       Review of Systems  History obtained from the patient  General ROS: negative  Psychological ROS: negative  Gastrointestinal ROS: no abdominal pain, change in bowel habits, or black or bloody stools  Musculoskeletal ROS: negative  Physical Exam  General Appearance: awake, alert, oriented, in no acute distress, well developed, well nourished, and in no acute distress  Skin: there are no suspicious lesions or rashes of concern, skin color, texture, turgor are normal; there are no bruises, rashes or lesions.  Head/Face: NCAT  Eyes: No gross abnormalities., PERRL, and EOMI  Neck: neck- supple, no mass, non-tender  Back: no pain to palpation  Abdomen: Soft, non-tender, normal bowel sounds; no bruits, organomegaly or masses.  Extremities: Extremities warm to touch, pink, with no edema.  Musculoskeletal: negative    /74   Ht 1.524 m (5')   Wt 54.8 kg (120 lb 12.8 oz)   LMP 11/17/2024 (Exact Date)   BMI 23.59 kg/m²    Problem List Items Addressed This Visit    None  Visit Diagnoses       Endometriosis    -  Primary    Relevant Medications    megestrol (Megace) 20 mg tablet         Set up co2 laser lap

## 2024-11-21 ENCOUNTER — APPOINTMENT (OUTPATIENT)
Dept: PRIMARY CARE | Facility: CLINIC | Age: 29
End: 2024-11-21
Payer: COMMERCIAL

## 2024-11-21 VITALS — HEART RATE: 52 BPM | DIASTOLIC BLOOD PRESSURE: 60 MMHG | OXYGEN SATURATION: 98 % | SYSTOLIC BLOOD PRESSURE: 110 MMHG

## 2024-11-21 DIAGNOSIS — K21.9 GASTROESOPHAGEAL REFLUX DISEASE WITHOUT ESOPHAGITIS: Primary | ICD-10-CM

## 2024-11-21 DIAGNOSIS — F41.9 ANXIETY: ICD-10-CM

## 2024-11-21 PROCEDURE — 99214 OFFICE O/P EST MOD 30 MIN: CPT | Performed by: FAMILY MEDICINE

## 2024-11-21 RX ORDER — PANTOPRAZOLE SODIUM 40 MG/1
40 TABLET, DELAYED RELEASE ORAL DAILY
Qty: 30 TABLET | Refills: 2 | Status: SHIPPED | OUTPATIENT
Start: 2024-11-21

## 2024-11-21 RX ORDER — ALPRAZOLAM 0.25 MG/1
0.25 TABLET ORAL 2 TIMES DAILY PRN
Qty: 30 TABLET | Refills: 0 | Status: SHIPPED | OUTPATIENT
Start: 2024-11-21

## 2024-11-21 RX ORDER — PROPRANOLOL HYDROCHLORIDE 20 MG/1
40 TABLET ORAL 2 TIMES DAILY PRN
Qty: 60 TABLET | Refills: 0 | Status: SHIPPED | OUTPATIENT
Start: 2024-11-21 | End: 2025-05-20

## 2024-11-21 NOTE — PROGRESS NOTES
Subjective   Patient ID: Elmira Harrison is a 29 y.o. female who presents for Follow-up (Has been having palpations since Sunday- she state they are worse when she is laying down and is not getting better with the medication. ).    HPI    Previously followed by Dr. Lan in [place] - had Holter monitor about a year ago and then started workup for possible POTs    She had been treated with Propranolol     Taking Xanax in the morning and seems to help with symptoms in the day    Tolerating increased dosage of Escitalopram  Did not tolerate Diazepam - increased heart rate and flushing with blotching  Currently taking 1/2 tablet in am and then 1/2 tablet in the afternoon if needed.    She did see Dr. Padgett, ob/gyn, and he would like to perform laparoscopy for her endometriosis   Menstrual cycles heavier for the first few days, usually fairly regular    Bowel movements normal but sometimes with lots of discomfort    Review of Systems    Review of Systems negative except as noted in HPI and Chief complaint.     Objective             VITALS:  /60 (BP Location: Left arm, Patient Position: Sitting, BP Cuff Size: Adult)   Pulse 52   LMP 11/17/2024 (Exact Date)   SpO2 98%      Physical Exam  Constitutional:       General: She is not in acute distress.     Appearance: Normal appearance.   HENT:      Head: Normocephalic and atraumatic.      Right Ear: Tympanic membrane, ear canal and external ear normal.      Left Ear: Tympanic membrane, ear canal and external ear normal.      Nose: Nose normal.      Mouth/Throat:      Mouth: Mucous membranes are moist.      Pharynx: No oropharyngeal exudate or posterior oropharyngeal erythema.   Eyes:      Extraocular Movements: Extraocular movements intact.      Conjunctiva/sclera: Conjunctivae normal.      Pupils: Pupils are equal, round, and reactive to light.   Cardiovascular:      Rate and Rhythm: Normal rate and regular rhythm.      Heart sounds: No murmur heard.  Pulmonary:       Effort: Pulmonary effort is normal.      Breath sounds: Normal breath sounds.   Abdominal:      General: Bowel sounds are normal.      Palpations: Abdomen is soft.   Musculoskeletal:         General: Normal range of motion.      Cervical back: No rigidity.   Lymphadenopathy:      Cervical: No cervical adenopathy.   Skin:     General: Skin is warm and dry.      Findings: No rash.   Neurological:      General: No focal deficit present.      Mental Status: She is alert and oriented to person, place, and time.      Cranial Nerves: No cranial nerve deficit.      Gait: Gait normal.   Psychiatric:         Mood and Affect: Mood normal.         Behavior: Behavior normal.         Assessment/Plan   Problem List Items Addressed This Visit       Anxiety    Relevant Medications    propranolol (Inderal) 20 mg tablet    ALPRAZolam (Xanax) 0.25 mg tablet     Other Visit Diagnoses       Gastroesophageal reflux disease without esophagitis    -  Primary    Relevant Medications    pantoprazole (ProtoNix) 40 mg EC tablet    Other Relevant Orders    Esophagogastroduodenoscopy (EGD)            FOLLOW UP 3 MONTHS, SOONER WITH ANY PROBLEMS OR CONCERNS.

## 2024-11-25 ENCOUNTER — TELEPHONE (OUTPATIENT)
Dept: CARDIOLOGY | Facility: HOSPITAL | Age: 29
End: 2024-11-25
Payer: COMMERCIAL

## 2024-11-25 NOTE — TELEPHONE ENCOUNTER
Patient called and LVM she stated that her PCP referred her to be seen because of the heart palpitations.  Called back in regards to scheduling appointment.       Thank you!  Shay CHAU

## 2024-12-03 ENCOUNTER — APPOINTMENT (OUTPATIENT)
Dept: PRIMARY CARE | Facility: CLINIC | Age: 29
End: 2024-12-03
Payer: COMMERCIAL

## 2024-12-03 DIAGNOSIS — F41.9 ANXIETY: Primary | ICD-10-CM

## 2024-12-03 ASSESSMENT — ANXIETY QUESTIONNAIRES
1. FEELING NERVOUS, ANXIOUS, OR ON EDGE: NEARLY EVERY DAY
7. FEELING AFRAID AS IF SOMETHING AWFUL MIGHT HAPPEN: SEVERAL DAYS
3. WORRYING TOO MUCH ABOUT DIFFERENT THINGS: MORE THAN HALF THE DAYS
6. BECOMING EASILY ANNOYED OR IRRITABLE: NEARLY EVERY DAY
4. TROUBLE RELAXING: MORE THAN HALF THE DAYS
5. BEING SO RESTLESS THAT IT IS HARD TO SIT STILL: MORE THAN HALF THE DAYS
GAD7 TOTAL SCORE: 15
2. NOT BEING ABLE TO STOP OR CONTROL WORRYING: MORE THAN HALF THE DAYS
IF YOU CHECKED OFF ANY PROBLEMS ON THIS QUESTIONNAIRE, HOW DIFFICULT HAVE THESE PROBLEMS MADE IT FOR YOU TO DO YOUR WORK, TAKE CARE OF THINGS AT HOME, OR GET ALONG WITH OTHER PEOPLE: VERY DIFFICULT

## 2024-12-03 ASSESSMENT — PATIENT HEALTH QUESTIONNAIRE - PHQ9
5. POOR APPETITE OR OVEREATING: MORE THAN HALF THE DAYS
9. THOUGHTS THAT YOU WOULD BE BETTER OFF DEAD, OR OF HURTING YOURSELF: NOT AT ALL
SUM OF ALL RESPONSES TO PHQ QUESTIONS 1-9: 16
2. FEELING DOWN, DEPRESSED OR HOPELESS: SEVERAL DAYS
10. IF YOU CHECKED OFF ANY PROBLEMS, HOW DIFFICULT HAVE THESE PROBLEMS MADE IT FOR YOU TO DO YOUR WORK, TAKE CARE OF THINGS AT HOME, OR GET ALONG WITH OTHER PEOPLE: VERY DIFFICULT
4. FEELING TIRED OR HAVING LITTLE ENERGY: NEARLY EVERY DAY
6. FEELING BAD ABOUT YOURSELF - OR THAT YOU ARE A FAILURE OR HAVE LET YOURSELF OR YOUR FAMILY DOWN: SEVERAL DAYS
1. LITTLE INTEREST OR PLEASURE IN DOING THINGS: SEVERAL DAYS
7. TROUBLE CONCENTRATING ON THINGS, SUCH AS READING THE NEWSPAPER OR WATCHING TELEVISION: NEARLY EVERY DAY
3. TROUBLE FALLING OR STAYING ASLEEP: NEARLY EVERY DAY
8. MOVING OR SPEAKING SO SLOWLY THAT OTHER PEOPLE COULD HAVE NOTICED. OR THE OPPOSITE, BEING SO FIGETY OR RESTLESS THAT YOU HAVE BEEN MOVING AROUND A LOT MORE THAN USUAL: MORE THAN HALF THE DAYS
SUM OF ALL RESPONSES TO PHQ9 QUESTIONS 1 & 2: 2

## 2024-12-03 NOTE — PROGRESS NOTES
Collaborative Care ([facility])  Progress Note    Type of Interaction: In Office    Start Time: 9:05 AM    End Time: 10:10 AM    Appointment: Scheduled    Reason for Visit:   Chief Complaint   Patient presents with    Follow-up       Interval History / Patient Symptoms:   Elmira Harrison is a 29 y.o. female currently enrolled in the [facility] program for symptom monitoring, brief therapy, and support. Patient presents today for follow up for Collaborative Care services.     Metrics:  Patient Health Questionnaire-9 Score: 16 (12/3/2024  9:08 AM)  DENISA-7 Total Score: 15 (12/3/2024  9:07 AM)       Interventions Provided:   Psychoeducation/Patient education, Cognitive Behavioral Therapy (CBT), and Motivational Interviewing (MI)    Progress Made:   As Expected and Gaining Insight/Knowledge    Response to Intervention:  We discussed the following elements during appointment:   Pt reports at last PCP appointment, her Lexapro was increased to 20 mg but doesn't notice a difference.  Xanax has helped- takes 1/2 in the morning and 1/2 at night.    Still trying to get answers to medical concerns she continues to have.  Has been on FMLA and will go back in January.  Unsure if medical issues are related to anxiety or something else.  Identifies her relationships as a stressor that causes significant anxiety.  Discussed decision making in relation to relationships and processed the risks and benefits of decisions.    Pt open to trying a vision board or way to identify her goals and what she wants for her future as a way to start looking at goal setting.    Xanax has helped- 1/2 in AM and 1/2 in PM     Patient was engaged, responsive, and interactive.     Plan:   Health- focus on feeling better and getting answers to her medical concerns  Start to think about goals and vision for the future, what it looks like and who is included- with challenge to not get 'pulled back in' if making a decision to distance self from negative  influences.  Family- distance self and break away- challenge to not get pulled back in    Follow Up / Next Appointment: Next appointment: 12/10/24

## 2024-12-04 ENCOUNTER — OFFICE VISIT (OUTPATIENT)
Dept: CARDIOLOGY | Facility: HOSPITAL | Age: 29
End: 2024-12-04
Payer: COMMERCIAL

## 2024-12-04 ENCOUNTER — ANCILLARY PROCEDURE (OUTPATIENT)
Dept: CARDIOLOGY | Facility: HOSPITAL | Age: 29
End: 2024-12-04
Payer: COMMERCIAL

## 2024-12-04 ENCOUNTER — PREP FOR PROCEDURE (OUTPATIENT)
Dept: OBSTETRICS AND GYNECOLOGY | Facility: CLINIC | Age: 29
End: 2024-12-04

## 2024-12-04 VITALS
HEIGHT: 60 IN | WEIGHT: 120 LBS | DIASTOLIC BLOOD PRESSURE: 70 MMHG | BODY MASS INDEX: 23.56 KG/M2 | HEART RATE: 82 BPM | SYSTOLIC BLOOD PRESSURE: 98 MMHG

## 2024-12-04 DIAGNOSIS — R10.2 PELVIC PAIN IN FEMALE: Primary | ICD-10-CM

## 2024-12-04 DIAGNOSIS — R00.2 PALPITATIONS: Primary | ICD-10-CM

## 2024-12-04 DIAGNOSIS — R00.2 PALPITATIONS: ICD-10-CM

## 2024-12-04 LAB
ATRIAL RATE: 82 BPM
BODY SURFACE AREA: 1.52 M2
P AXIS: 75 DEGREES
P OFFSET: 194 MS
P ONSET: 154 MS
PR INTERVAL: 136 MS
Q ONSET: 222 MS
QRS COUNT: 13 BEATS
QRS DURATION: 74 MS
QT INTERVAL: 378 MS
QTC CALCULATION(BAZETT): 441 MS
QTC FREDERICIA: 419 MS
R AXIS: 90 DEGREES
T AXIS: 70 DEGREES
T OFFSET: 411 MS
VENTRICULAR RATE: 82 BPM

## 2024-12-04 PROCEDURE — 93242 EXT ECG>48HR<7D RECORDING: CPT

## 2024-12-04 PROCEDURE — 3008F BODY MASS INDEX DOCD: CPT | Performed by: STUDENT IN AN ORGANIZED HEALTH CARE EDUCATION/TRAINING PROGRAM

## 2024-12-04 PROCEDURE — 93005 ELECTROCARDIOGRAM TRACING: CPT | Performed by: STUDENT IN AN ORGANIZED HEALTH CARE EDUCATION/TRAINING PROGRAM

## 2024-12-04 PROCEDURE — 93010 ELECTROCARDIOGRAM REPORT: CPT | Performed by: STUDENT IN AN ORGANIZED HEALTH CARE EDUCATION/TRAINING PROGRAM

## 2024-12-04 PROCEDURE — 99203 OFFICE O/P NEW LOW 30 MIN: CPT | Performed by: STUDENT IN AN ORGANIZED HEALTH CARE EDUCATION/TRAINING PROGRAM

## 2024-12-04 PROCEDURE — 1036F TOBACCO NON-USER: CPT | Performed by: STUDENT IN AN ORGANIZED HEALTH CARE EDUCATION/TRAINING PROGRAM

## 2024-12-04 PROCEDURE — 99213 OFFICE O/P EST LOW 20 MIN: CPT | Performed by: STUDENT IN AN ORGANIZED HEALTH CARE EDUCATION/TRAINING PROGRAM

## 2024-12-04 RX ORDER — CELECOXIB 400 MG/1
400 CAPSULE ORAL ONCE
OUTPATIENT
Start: 2024-12-04 | End: 2024-12-04

## 2024-12-04 RX ORDER — GABAPENTIN 600 MG/1
600 TABLET ORAL ONCE
OUTPATIENT
Start: 2024-12-04 | End: 2024-12-04

## 2024-12-04 NOTE — PROGRESS NOTES
Vera placed the Holter on the patient today for 7 days     Patient was told what can and can not be done while wearing it     Patient showed understanding

## 2024-12-04 NOTE — PROGRESS NOTES
[facility] Cardiology Clinic Note    Date: 12/04/24  Time: 7:24 PM    Subjective   Elmira Harrison is a 29 y.o. female who is referred to me for palpitation. Patient reports since patient had COVID in 2021 and has noted palpitation and lightheadedness. Patient was following with different cardiology but couldn't get apt with them so requested to see us. She had tilt table testing and holter and reports there was POTS?dysautonomia but I have no records to review. Patient reports she had miscarriage earlier this year and since then she has been having more palpitation and lightheadedness. Then started getting palpitation throughout the day   Gets lightheaded. NO syncope.  No chest pain or dyspnea. No HF symptoms.     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   Fluid intake-40 ounce in AM and added salt to food       Review of Systems:  Otherwise, limited cardiovascular review of systems is negative.        Medical History:   She has a past medical history of 23 weeks gestation of pregnancy ([facility]) (04/07/2017), Acute gastric ulcer without hemorrhage or perforation (12/17/2015), Acute upper respiratory infection, unspecified (10/01/2020), Allergic purpura (CMS-HCC) (12/11/2013), Anemia, Chest pain, unspecified (12/11/2013), Contusion of unspecified back wall of thorax, initial encounter (12/11/2013), Edema, unspecified (12/11/2013), Endometriosis, Fibroid, Gastro-esophageal reflux disease without esophagitis (12/11/2013), Intestinal malabsorption, unspecified (12/11/2013), 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), Migraine, Nausea (12/11/2013), Nonspecific lymphadenitis, unspecified (12/11/2013), Other conditions influencing health status  (12/11/2013), Other conditions influencing health status (12/11/2013), Other conditions influencing health status (12/11/2013), Other conditions influencing health status, Other disorders of intestinal carbohydrate absorption (12/11/2013), Other fatigue (12/11/2013), Other pulmonary embolism without acute cor pulmonale (12/11/2013), Pain in unspecified shoulder (12/11/2013), Personal history of other diseases of the female genital tract (02/22/2016), Personal history of other diseases of the respiratory system (10/25/2016), Personal history of other specified conditions (11/30/2016), Personal history of other specified conditions (11/04/2016), Phlebitis and thrombophlebitis of unspecified site (12/11/2013), Sciatica, unspecified side (12/11/2013), Unspecified abdominal pain (12/11/2013), Unspecified sprain of unspecified wrist, initial encounter (05/10/2017), and Zoster without complications (12/11/2013).  Surgical History:   Past Surgical History:   Procedure Laterality Date    APPENDECTOMY  2022    CHOLECYSTECTOMY  06/03/2016    Cholecystectomy Laparoscopic    LAPAROSCOPY DIAGNOSTIC / BIOPSY / ASPIRATION / LYSIS  02/22/2016    Exploratory Laparoscopy   PSHP@  Social History:   Social Drivers of Health with Concerns     Tobacco Use: Medium Risk (12/4/2024)    Patient History     Smoking Tobacco Use: Former     Smokeless Tobacco Use: Never     Passive Exposure: Not on file   Alcohol Use: Not on file   Financial Resource Strain: Not on file   Food Insecurity: Not on file   Transportation Needs: Not on file   Physical Activity: Not on file   Stress: Not on file   Social Connections: Not on file   Intimate Partner Violence: Not on file   Depression: Not at risk (12/3/2024)    PHQ-2     PHQ-2 Score: 2   Recent Concern: Depression - At risk (9/23/2024)    PHQ-2     PHQ-2 Score: 4   Housing Stability: Not on file   Utilities: Not on file   Digital Equity: Not on file   Health Literacy: Not on file     Family History:   No  family history on file.   Allergies:  Penicillins, Valium [diazepam], Acetaminophen, and Metoprolol    Outpatient Medications:  Current Outpatient Medications   Medication Instructions    Ajovy Autoinjector 225 mg, Every 28 days    ALPRAZolam (XANAX) 0.25 mg, oral, 2 times daily PRN    clobetasol (Temovate) 0.05 % external solution Apply topically.    escitalopram (LEXAPRO) 20 mg, oral, Daily    megestrol (MEGACE) 20 mg, oral, Daily    pantoprazole (PROTONIX) 40 mg, oral, Daily, Do not crush, chew, or split.    propranolol (INDERAL) 40 mg, oral, 2 times daily PRN       Objective     Physical Exam  Vitals:    12/04/24 1239   BP: 98/70   Pulse: 82   Weight: 54.4 kg (120 lb)   Height: 1.524 m (5')     Wt Readings from Last 3 Encounters:   12/04/24 54.4 kg (120 lb)   11/20/24 54.8 kg (120 lb 12.8 oz)   10/26/24 54 kg (119 lb)       General: Alert and Oriented, No distress, cooperative  Head: Normocephalic without obvious abnormality, atraumatic  Eyes: Conjunctiva/corneas clear, EOM's grossly intact  Neck: Supple, trachea midline, No thyroid enlargement/tenderness/nodules; No JVD  Lungs: Clear to auscultation bilaterally, no wheezes, rhonci, or rales. respirations unlabored  Chest Wall: No tenderness or deformity  Heart: Regular rhythm, normal S1/S2, no murmur  Abdomen: Soft, non-tender, Non-distended, bowel sounds active  Extremities: No edema, no cyanosis, no clubbing  Skin: Skin color, texture, turgor normal.  No rashes or lesions noted  Neurologic: Alert and oriented x 3, grossly moving all extremities, speech intact        I have personally reviewed the following images and laboratory findings:  ECG: NSR with PAC  Echocardiogram: not done    Laboratory values:   Ancillary Procedure on 12/04/2024   Component Date Value    BSA 12/04/2024 1.52    Lab on 10/21/2024   Component Date Value    Anticardiolipin IgA 10/21/2024 <0.5     Anticardiolipin IgG 10/21/2024 <1.6     Anticardiolipin IgM 10/21/2024 0.7     Beta-2 Glyco  1 IgG 10/21/2024 <1.4     Beta-2 Glyco 1 IgA 10/21/2024 <0.6     Beta 2 Glyco 1 IgM 10/21/2024 1.9     DRVVT Screen 10/21/2024 0.97     DRVVT Confirmation 10/21/2024 1.01     DRVVT Test Ratio 10/21/2024 0.97     SCT Screen 10/21/2024 1.04     SCT Confirmation 10/21/2024 0.98     SCT Test Ratio 10/21/2024 1.06     Lupus Anticoagulant Inte* 10/21/2024                      Value:No evidence of lupus anticoagulant in these assays (DRVVT and Silica Clotting Time (SCT)). Assay interferences may occur in the presence of factor deficiency/inhibitor and/or anticoagulants. For patients on anti-Vitamin K therapy, repeating DRVVT testing might be indicated when the patient is off anti-vitamin K therapy. The DRVVT assay contains a heparin neutralizer up to 1.0 U/mL. Higher concentrations of heparin may cause interferences. SCT results are not affected by UF heparin up to 0.5 U/mL and LMW Heparin up to 1.0 U/mL. Higher concentrations of heparin may cause interferences. Correlation with clinical findings and clinical history is necessary to assess significance of results in an individual patient.    Factor VIII Activity 10/21/2024 95     Von Willebrand Ag 10/21/2024 88     VWF GP1bM Activity 10/21/2024 76     Von Willebrand Multimers 10/21/2024 Comment     Ferritin 10/21/2024 38     Iron 10/21/2024 123     UIBC 10/21/2024 254     TIBC 10/21/2024 377     % Saturation 10/21/2024 33     Extra Tube 10/21/2024 Hold for add-ons.     Extra Tube 10/21/2024 Hold for add-ons.      CBC -  Lab Results   Component Value Date    WBC 8.7 09/23/2024    HGB 13.2 09/23/2024    HCT 39.8 09/23/2024    MCV 90 09/23/2024     09/23/2024       CMP -  Lab Results   Component Value Date    CALCIUM 9.6 09/23/2024    PROT 7.6 09/23/2024    ALBUMIN 5.1 (H) 09/23/2024    AST 12 09/23/2024    ALT 9 09/23/2024    ALKPHOS 32 (L) 09/23/2024    BILITOT 0.6 09/23/2024       LIPID PANEL -   Lab Results   Component Value Date    CHOL 185 01/14/2023    HDL 48.0  "01/14/2023    CHHDL 3.9 01/14/2023    VLDL 15 01/14/2023    TRIG 76 01/14/2023       RENAL FUNCTION PANEL -   Lab Results   Component Value Date    K 4.0 09/23/2024       No results found for: \"BNP\", \"HGBA1C\"     Assessment/Plan   Palpitation  Dizziness.   PAC    Plan:  -I will obtain transthoracic echo to evaluate for structural abnormalities.  -I will refer for autonomic testing to evaluate for POTS and other abnormalities.  -I will obtain 7-day Holter.  -I will check TSH with T4.   -Increase fluid intake to at least 64 oz daily. Increase salt intake. Encourage to wear compression stockings.     RTC after testing.    In addition, the following orders were placed today:  Orders Placed This Encounter   Procedures    TSH with reflex to Free T4 if abnormal    Holter Or Event Cardiac Monitor    ECG 12 lead (Clinic Performed)    Transthoracic Echo (TTE) Complete    Autonomic Testing                 SIGNATURE: Roly Bejarano MD PATIENT NAME: Elmira Harrison   DATE/TIME: December 4, 2024 7:24 PM MRN: [id number]                             "

## 2024-12-05 PROBLEM — R10.2 PELVIC PAIN IN FEMALE: Status: ACTIVE | Noted: 2024-12-04

## 2024-12-07 ENCOUNTER — LAB (OUTPATIENT)
Dept: LAB | Facility: LAB | Age: 29
End: 2024-12-07
Payer: COMMERCIAL

## 2024-12-07 DIAGNOSIS — R00.2 PALPITATIONS: ICD-10-CM

## 2024-12-07 LAB — TSH SERPL-ACNC: 1.25 MIU/L (ref 0.44–3.98)

## 2024-12-07 PROCEDURE — 84443 ASSAY THYROID STIM HORMONE: CPT

## 2024-12-07 PROCEDURE — 36415 COLL VENOUS BLD VENIPUNCTURE: CPT

## 2024-12-09 ENCOUNTER — TELEPHONE (OUTPATIENT)
Dept: CARDIOLOGY | Facility: HOSPITAL | Age: 29
End: 2024-12-09
Payer: COMMERCIAL

## 2024-12-09 NOTE — TELEPHONE ENCOUNTER
12/9/24  1137  Called TSH results to patient with patient verbalizing understanding.    ----- Message from Roly Bejarano sent at 12/8/2024 12:05 AM EST -----  ok  ----- Message -----  From: Lab, Background User  Sent: 12/7/2024   1:11 PM EST  To: Roly Bejarano MD

## 2024-12-10 ENCOUNTER — APPOINTMENT (OUTPATIENT)
Dept: PRIMARY CARE | Facility: CLINIC | Age: 29
End: 2024-12-10
Payer: COMMERCIAL

## 2024-12-16 ENCOUNTER — TELEPHONE (OUTPATIENT)
Dept: PRIMARY CARE | Facility: CLINIC | Age: 29
End: 2024-12-16
Payer: COMMERCIAL

## 2024-12-18 LAB — BODY SURFACE AREA: 1.52 M2

## 2024-12-18 PROCEDURE — 93244 EXT ECG>48HR<7D REV&INTERPJ: CPT | Performed by: INTERNAL MEDICINE

## 2024-12-19 ENCOUNTER — TELEPHONE (OUTPATIENT)
Dept: CARDIOLOGY | Facility: HOSPITAL | Age: 29
End: 2024-12-19
Payer: COMMERCIAL

## 2024-12-19 NOTE — TELEPHONE ENCOUNTER
----- Message from Roly Bejarano sent at 12/19/2024 10:22 AM EST -----  She has a lot of PACs.  Make sure that patient gets her other testing done and follow-up afterwards in the office.  ----- Message -----  From: Nathan Mcclelland DO  Sent: 12/18/2024   9:38 AM EST  To: Roly Bejarano MD

## 2024-12-19 NOTE — TELEPHONE ENCOUNTER
RN called and spoke with patient regarding frequent PAC's on holter monitor. RN notified patient that the physician would like her to complete the rest of her testing and follow up after that is complete. Patient stated that she had called to set up her autonomic testing and has left several messages that have not been returned.

## 2024-12-20 ENCOUNTER — TELEPHONE (OUTPATIENT)
Dept: CARDIOLOGY | Facility: HOSPITAL | Age: 29
End: 2024-12-20
Payer: COMMERCIAL

## 2024-12-23 ENCOUNTER — TELEPHONE (OUTPATIENT)
Dept: CARDIOLOGY | Facility: HOSPITAL | Age: 29
End: 2024-12-23
Payer: COMMERCIAL

## 2024-12-23 ENCOUNTER — PATIENT MESSAGE (OUTPATIENT)
Dept: PRIMARY CARE | Facility: CLINIC | Age: 29
End: 2024-12-23
Payer: COMMERCIAL

## 2024-12-23 DIAGNOSIS — K21.9 GASTROESOPHAGEAL REFLUX DISEASE WITHOUT ESOPHAGITIS: ICD-10-CM

## 2024-12-23 DIAGNOSIS — F41.9 ANXIETY: ICD-10-CM

## 2024-12-23 RX ORDER — ALPRAZOLAM 0.25 MG/1
0.25 TABLET ORAL 2 TIMES DAILY PRN
Qty: 14 TABLET | Refills: 0 | Status: SHIPPED | OUTPATIENT
Start: 2024-12-23

## 2024-12-23 RX ORDER — PANTOPRAZOLE SODIUM 40 MG/1
40 TABLET, DELAYED RELEASE ORAL DAILY
Qty: 90 TABLET | Refills: 1 | Status: SHIPPED | OUTPATIENT
Start: 2024-12-23

## 2024-12-23 RX ORDER — PROPRANOLOL HYDROCHLORIDE 20 MG/1
40 TABLET ORAL 2 TIMES DAILY PRN
Qty: 60 TABLET | Refills: 0 | Status: SHIPPED | OUTPATIENT
Start: 2024-12-23 | End: 2025-06-21

## 2024-12-23 NOTE — TELEPHONE ENCOUNTER
RN called autonomic testing and spoke with staff. Per autonomic testing  they did not receive any messages from patient.      RN then called patient and left a message for her to call Autonomic testing at [phone] because they do not have a message from her.

## 2024-12-30 ENCOUNTER — HOSPITAL ENCOUNTER (OUTPATIENT)
Dept: CARDIOLOGY | Facility: HOSPITAL | Age: 29
Discharge: HOME | End: 2024-12-30
Payer: COMMERCIAL

## 2024-12-30 DIAGNOSIS — R00.2 PALPITATIONS: ICD-10-CM

## 2024-12-30 DIAGNOSIS — R42 DIZZINESS AND GIDDINESS: ICD-10-CM

## 2024-12-30 LAB
EJECTION FRACTION APICAL 4 CHAMBER: 60.8
EJECTION FRACTION: 58 %
GLOBAL LONGITUDINAL STRAIN: 19.7 %
LEFT ATRIUM VOLUME AREA LENGTH INDEX BSA: 22.1 ML/M2
LEFT VENTRICLE INTERNAL DIMENSION DIASTOLE: 4.18 CM (ref 3.5–6)
LEFT VENTRICULAR OUTFLOW TRACT DIAMETER: 1.56 CM
LV EJECTION FRACTION BIPLANE: 58 %
MITRAL VALVE E/A RATIO: 1.86
RIGHT VENTRICLE FREE WALL PEAK S': 12.62 CM/S
RIGHT VENTRICLE PEAK SYSTOLIC PRESSURE: 19.5 MMHG
TRICUSPID ANNULAR PLANE SYSTOLIC EXCURSION: 2.2 CM

## 2024-12-30 PROCEDURE — 93306 TTE W/DOPPLER COMPLETE: CPT | Performed by: INTERNAL MEDICINE

## 2024-12-30 PROCEDURE — 93306 TTE W/DOPPLER COMPLETE: CPT

## 2024-12-30 PROCEDURE — 93356 MYOCRD STRAIN IMG SPCKL TRCK: CPT | Performed by: INTERNAL MEDICINE

## 2024-12-31 ENCOUNTER — DOCUMENTATION (OUTPATIENT)
Dept: PRIMARY CARE | Facility: CLINIC | Age: 29
End: 2024-12-31
Payer: COMMERCIAL

## 2024-12-31 DIAGNOSIS — F41.9 ANXIETY: Primary | ICD-10-CM

## 2024-12-31 PROCEDURE — 99493 SBSQ PSYC COLLAB CARE MGMT: CPT | Performed by: FAMILY MEDICINE

## 2025-01-03 ENCOUNTER — TELEPHONE (OUTPATIENT)
Dept: CARDIOLOGY | Facility: HOSPITAL | Age: 30
End: 2025-01-03
Payer: COMMERCIAL

## 2025-01-03 NOTE — TELEPHONE ENCOUNTER
----- Message from Roly Bejarano sent at 1/2/2025  4:46 PM EST -----  Routine  ----- Message -----  From: Yosvany Syngo - Cardiology Results In  Sent: 12/30/2024  12:09 PM EST  To: Roly Bejarano MD

## 2025-01-03 NOTE — TELEPHONE ENCOUNTER
RN called patient and reviewed echo results, patient verbalized understanding and is agreeable to follow up in March. Patient also verbalized that she finally got in touch with and got her autonomic testing scheduled.

## 2025-01-05 ENCOUNTER — APPOINTMENT (OUTPATIENT)
Dept: PRIMARY CARE | Facility: CLINIC | Age: 30
End: 2025-01-05
Payer: COMMERCIAL

## 2025-01-05 ENCOUNTER — TELEMEDICINE (OUTPATIENT)
Dept: PRIMARY CARE | Facility: CLINIC | Age: 30
End: 2025-01-05
Payer: COMMERCIAL

## 2025-01-05 DIAGNOSIS — J10.1 INFLUENZA A: Primary | ICD-10-CM

## 2025-01-05 PROCEDURE — 1036F TOBACCO NON-USER: CPT | Performed by: NURSE PRACTITIONER

## 2025-01-05 PROCEDURE — 99212 OFFICE O/P EST SF 10 MIN: CPT | Performed by: NURSE PRACTITIONER

## 2025-01-05 ASSESSMENT — LIFESTYLE VARIABLES
HISTORY_OF_SMOKING: I AM A CURRENT SMOKER
HISTORY_OF_SMOKING: I AM A CURRENT SMOKER

## 2025-01-05 NOTE — PATIENT INSTRUCTIONS
Influenza A  Continue Tamiflu as prescribed  Encouraged Advil cold/sinus  Encouraged Vitamin C, Zinc and Echinacea  Encouraged steamy showers  Netti Pot  Rest     FU WITH PCP IN 2 WEEKS IF NOT BETTER OR YOU ARE WORSE

## 2025-01-05 NOTE — PROGRESS NOTES
Elmira Harrison is a 29 y.o. female who presents virtually today for a sick visit    I performed this visit using real-time telehealth tools, including an audio/video connection between Elmira Harrison  and myself, Elmira Jimenez CNP,  within the state of [place].  Consent has been obtained for this visit.  I have verbally confirmed with Elmira Harrison (or parent if under 18) that they are physically located in the state of [place] during this virtual visit.  Telemedicine appropriate evaluation completed.  Unable to perform complete physical exam due to virtual visit.    Chief Complaint   Patient presents with    Cough    Sore Throat    Nasal Congestion       Symptoms: Nasal congestion, cough, sore throat   Dx'd with Influenza A yesterday, Rx  Tamiflu   States her nasal congestion is so bad, she has a sore throat and dry cough       Allergies   Allergen Reactions    Penicillins Rash, Unknown and Nausea/vomiting     Other reaction(s): Unknown    Valium [Diazepam] Hives and Palpitations    Acetaminophen Itching    Metoprolol Hives       Review of Systems  ROS was completed and all systems are negative with the exception of what was noted in the the HPI.       Objective   Vitals:  There were no vitals taken for this visit.      Current Outpatient Medications   Medication Instructions    Ajovy Autoinjector 225 mg, Every 28 days    ALPRAZolam (XANAX) 0.25 mg, oral, 2 times daily PRN    clobetasol (Temovate) 0.05 % external solution Apply topically.    escitalopram (LEXAPRO) 20 mg, oral, Daily    megestrol (MEGACE) 20 mg, oral, Daily    pantoprazole (PROTONIX) 40 mg, oral, Daily, Do not crush, chew, or split.    propranolol (INDERAL) 40 mg, oral, 2 times daily PRN         Physical Exam  Pulmonary:      Effort: Pulmonary effort is normal.   Neurological:      Mental Status: She is alert and oriented to person, place, and time.   Psychiatric:         Mood and Affect: Mood normal.         Behavior: Behavior normal.          Thought Content: Thought content normal.         Assessment & Plan  Influenza A  Continue Tamiflu as prescribed  Encouraged Advil cold/sinus  Encouraged Vitamin C, Zinc and Echinacea  Encouraged steamy showers  Netti Pot  Rest

## 2025-01-10 ENCOUNTER — APPOINTMENT (OUTPATIENT)
Dept: PRIMARY CARE | Facility: CLINIC | Age: 30
End: 2025-01-10
Payer: COMMERCIAL

## 2025-01-10 VITALS
HEART RATE: 60 BPM | WEIGHT: 120 LBS | DIASTOLIC BLOOD PRESSURE: 80 MMHG | SYSTOLIC BLOOD PRESSURE: 120 MMHG | BODY MASS INDEX: 23.44 KG/M2 | OXYGEN SATURATION: 98 %

## 2025-01-10 DIAGNOSIS — Z13.31 POSITIVE SCREENING FOR DEPRESSION ON 9-ITEM PATIENT HEALTH QUESTIONNAIRE (PHQ-9): ICD-10-CM

## 2025-01-10 DIAGNOSIS — Z79.899 HIGH RISK MEDICATION USE: ICD-10-CM

## 2025-01-10 DIAGNOSIS — F41.9 ANXIETY: Primary | ICD-10-CM

## 2025-01-10 PROBLEM — D75.9 BLOOD DYSCRASIA: Status: ACTIVE | Noted: 2025-01-04

## 2025-01-10 PROBLEM — E28.2 PCOS (POLYCYSTIC OVARIAN SYNDROME): Status: ACTIVE | Noted: 2025-01-04

## 2025-01-10 PROBLEM — K58.9 IBS (IRRITABLE BOWEL SYNDROME): Status: ACTIVE | Noted: 2025-01-04

## 2025-01-10 PROBLEM — G90.9 AUTONOMIC DYSFUNCTION: Status: ACTIVE | Noted: 2025-01-04

## 2025-01-10 LAB
AMPHETAMINES UR QL SCN: NORMAL
BARBITURATES UR QL SCN: NORMAL
BENZODIAZ UR QL SCN: NORMAL
BZE UR QL SCN: NORMAL
CANNABINOIDS UR QL SCN: NORMAL
FENTANYL+NORFENTANYL UR QL SCN: NORMAL
METHADONE UR QL SCN: NORMAL
OPIATES UR QL SCN: NORMAL
OXYCODONE+OXYMORPHONE UR QL SCN: NORMAL
PCP UR QL SCN: NORMAL

## 2025-01-10 PROCEDURE — 1036F TOBACCO NON-USER: CPT | Performed by: FAMILY MEDICINE

## 2025-01-10 PROCEDURE — 99213 OFFICE O/P EST LOW 20 MIN: CPT | Performed by: FAMILY MEDICINE

## 2025-01-10 PROCEDURE — 80346 BENZODIAZEPINES1-12: CPT

## 2025-01-10 PROCEDURE — 80307 DRUG TEST PRSMV CHEM ANLYZR: CPT

## 2025-01-10 RX ORDER — ESCITALOPRAM OXALATE 20 MG/1
20 TABLET ORAL DAILY
Qty: 90 TABLET | Refills: 1 | Status: SHIPPED | OUTPATIENT
Start: 2025-01-10

## 2025-01-10 RX ORDER — ALPRAZOLAM 0.25 MG/1
0.25 TABLET ORAL 2 TIMES DAILY PRN
Qty: 30 TABLET | Refills: 0 | Status: SHIPPED | OUTPATIENT
Start: 2025-01-10

## 2025-01-10 ASSESSMENT — PATIENT HEALTH QUESTIONNAIRE - PHQ9
SUM OF ALL RESPONSES TO PHQ QUESTIONS 1-9: 14
9. THOUGHTS THAT YOU WOULD BE BETTER OFF DEAD, OR OF HURTING YOURSELF: NOT AT ALL
2. FEELING DOWN, DEPRESSED OR HOPELESS: SEVERAL DAYS
1. LITTLE INTEREST OR PLEASURE IN DOING THINGS: MORE THAN HALF THE DAYS
5. POOR APPETITE OR OVEREATING: MORE THAN HALF THE DAYS
SUM OF ALL RESPONSES TO PHQ9 QUESTIONS 1 AND 2: 3
6. FEELING BAD ABOUT YOURSELF - OR THAT YOU ARE A FAILURE OR HAVE LET YOURSELF OR YOUR FAMILY DOWN: NOT AT ALL
8. MOVING OR SPEAKING SO SLOWLY THAT OTHER PEOPLE COULD HAVE NOTICED. OR THE OPPOSITE, BEING SO FIGETY OR RESTLESS THAT YOU HAVE BEEN MOVING AROUND A LOT MORE THAN USUAL: MORE THAN HALF THE DAYS
4. FEELING TIRED OR HAVING LITTLE ENERGY: MORE THAN HALF THE DAYS
3. TROUBLE FALLING OR STAYING ASLEEP OR SLEEPING TOO MUCH: NEARLY EVERY DAY
7. TROUBLE CONCENTRATING ON THINGS, SUCH AS READING THE NEWSPAPER OR WATCHING TELEVISION: MORE THAN HALF THE DAYS
10. IF YOU CHECKED OFF ANY PROBLEMS, HOW DIFFICULT HAVE THESE PROBLEMS MADE IT FOR YOU TO DO YOUR WORK, TAKE CARE OF THINGS AT HOME, OR GET ALONG WITH OTHER PEOPLE: SOMEWHAT DIFFICULT

## 2025-01-10 ASSESSMENT — ANXIETY QUESTIONNAIRES
IF YOU CHECKED OFF ANY PROBLEMS ON THIS QUESTIONNAIRE, HOW DIFFICULT HAVE THESE PROBLEMS MADE IT FOR YOU TO DO YOUR WORK, TAKE CARE OF THINGS AT HOME, OR GET ALONG WITH OTHER PEOPLE: SOMEWHAT DIFFICULT
7. FEELING AFRAID AS IF SOMETHING AWFUL MIGHT HAPPEN: SEVERAL DAYS
6. BECOMING EASILY ANNOYED OR IRRITABLE: NEARLY EVERY DAY
GAD7 TOTAL SCORE: 13
5. BEING SO RESTLESS THAT IT IS HARD TO SIT STILL: SEVERAL DAYS
2. NOT BEING ABLE TO STOP OR CONTROL WORRYING: MORE THAN HALF THE DAYS
4. TROUBLE RELAXING: MORE THAN HALF THE DAYS
3. WORRYING TOO MUCH ABOUT DIFFERENT THINGS: MORE THAN HALF THE DAYS
1. FEELING NERVOUS, ANXIOUS, OR ON EDGE: MORE THAN HALF THE DAYS

## 2025-01-10 NOTE — PROGRESS NOTES
Subjective   Patient ID: Elmira Harrison is a 29 y.o. female who presents for Follow-up (RTW/ medication follow up.).    HPI  ANXIETY: improved slightly on Escitalopram  Stll needing 1/2 Alprazolam in am and in afternoon  Working with Behavioral Health Collaborative Care Team     TACHYCARDIA: did see cardiology  Did not tolerate Metoprolol due to rash  Currently on Propranolol  Has appointment for further neurologic testing later this month    Review of Systems    Review of Systems negative except as noted in HPI and Chief complaint.     Objective         DENISA-7 Total Score: 13     VITALS:  /80 (BP Location: Left arm, Patient Position: Sitting, BP Cuff Size: Adult)   Pulse 60   Wt 54.4 kg (120 lb)   SpO2 98%   BMI 23.44 kg/m²      Physical Exam  Constitutional:       General: She is not in acute distress.     Appearance: Normal appearance. She is not ill-appearing.   HENT:      Head: Normocephalic and atraumatic.   Neck:      Vascular: No carotid bruit.   Cardiovascular:      Rate and Rhythm: Normal rate and regular rhythm.      Pulses: Normal pulses.      Heart sounds: Normal heart sounds. No murmur heard.     No gallop.   Pulmonary:      Effort: Pulmonary effort is normal.      Breath sounds: Normal breath sounds. No wheezing, rhonchi or rales.   Musculoskeletal:      Cervical back: Normal range of motion and neck supple. No rigidity or tenderness.   Lymphadenopathy:      Cervical: No cervical adenopathy.   Skin:     General: Skin is warm and dry.   Neurological:      Mental Status: She is alert.   Psychiatric:         Mood and Affect: Mood normal.         Behavior: Behavior normal.         Assessment/Plan   Problem List Items Addressed This Visit       Anxiety - Primary     Continue with Escitalopram  Encouraging follow up with Behavioral Health Collaborative Care Team       Patient Health Questionnaire-2 Score: 3 (1/10/2025  3:46 PM).  This indicates a positive screen but may not meet the criteria for  a clinical depression diagnosis. Symptoms were reviewed with Elmira.  Follow-up within the next 3 months is recommended to re-assess symptoms and monitor mental health status IF SYMPTOMS CHANGE OR WORSEN.          Relevant Medications    ALPRAZolam (Xanax) 0.25 mg tablet    escitalopram (Lexapro) 20 mg tablet     Other Visit Diagnoses       High risk medication use        Relevant Orders    Drug Screen, Urine With Reflex to Confirmation (Completed)    Benzodiazepine Confirmation, Urine (Completed)    OOB Internal Tracking (Completed)    Positive screening for depression on 9-item Patient Health Questionnaire (PHQ-9)        Positive PHQ-9 score related to increased anxiety with palpitations.  Follow up 3 months for recheck, follow up with Behavioral Health Team.            FOLLOW UP 3 MONTHS, SOONER WITH ANY PROBLEMS OR CONCERNS.

## 2025-01-10 NOTE — LETTER
January 10, 2025     Patient: Elmira Harrison   YOB: 1995   Date of Visit: 1/10/2025       To Whom It May Concern:    Elmira Harrison was seen in my clinic on 1/10/2025 at 3:30 pm. She is cleared to return to work on Monday 1/13/2024 with the following recommendations : if her heart rate increases or she feels light headed please allow her to sit down and take in some fluids until episodes resolve.    If you have any questions or concerns, please don't hesitate to call.         Sincerely,         Jael Mccartney DO        CC: No Recipients

## 2025-01-15 LAB
1OH-MIDAZOLAM UR CFM-MCNC: <25 NG/ML
7AMINOCLONAZEPAM UR CFM-MCNC: <25 NG/ML
A-OH ALPRAZ UR CFM-MCNC: 48 NG/ML
ALPRAZ UR CFM-MCNC: <25 NG/ML
CHLORDIAZEP UR CFM-MCNC: <25 NG/ML
CLONAZEPAM UR CFM-MCNC: <25 NG/ML
DIAZEPAM UR CFM-MCNC: <25 NG/ML
LORAZEPAM UR CFM-MCNC: <25 NG/ML
MIDAZOLAM UR CFM-MCNC: <25 NG/ML
NORDIAZEPAM UR CFM-MCNC: <25 NG/ML
OXAZEPAM UR CFM-MCNC: <25 NG/ML
TEMAZEPAM UR CFM-MCNC: <25 NG/ML

## 2025-01-21 ENCOUNTER — HOSPITAL ENCOUNTER (OUTPATIENT)
Dept: NEUROLOGY | Facility: HOSPITAL | Age: 30
Discharge: HOME | End: 2025-01-21
Payer: COMMERCIAL

## 2025-01-21 DIAGNOSIS — R00.2 PALPITATIONS: ICD-10-CM

## 2025-01-21 PROCEDURE — 95923 AUTONOMIC NRV SYST FUNJ TEST: CPT | Performed by: PSYCHIATRY & NEUROLOGY

## 2025-01-21 PROCEDURE — 95924 ANS PARASYMP & SYMP W/TILT: CPT | Performed by: PSYCHIATRY & NEUROLOGY

## 2025-01-24 ENCOUNTER — APPOINTMENT (OUTPATIENT)
Dept: GASTROENTEROLOGY | Facility: EXTERNAL LOCATION | Age: 30
End: 2025-01-24
Payer: COMMERCIAL

## 2025-01-24 DIAGNOSIS — R68.81 EARLY SATIETY: ICD-10-CM

## 2025-01-24 DIAGNOSIS — K31.89 OTHER DISEASES OF STOMACH AND DUODENUM: ICD-10-CM

## 2025-01-24 DIAGNOSIS — K21.9 GASTROESOPHAGEAL REFLUX DISEASE WITHOUT ESOPHAGITIS: ICD-10-CM

## 2025-01-24 DIAGNOSIS — R10.13 EPIGASTRIC PAIN: Primary | ICD-10-CM

## 2025-01-24 PROCEDURE — 43239 EGD BIOPSY SINGLE/MULTIPLE: CPT | Performed by: INTERNAL MEDICINE

## 2025-01-24 PROCEDURE — 88305 TISSUE EXAM BY PATHOLOGIST: CPT

## 2025-01-24 PROCEDURE — 88305 TISSUE EXAM BY PATHOLOGIST: CPT | Performed by: PATHOLOGY

## 2025-01-27 ENCOUNTER — TELEPHONE (OUTPATIENT)
Dept: CARDIOLOGY | Facility: HOSPITAL | Age: 30
End: 2025-01-27
Payer: COMMERCIAL

## 2025-01-27 ENCOUNTER — LAB REQUISITION (OUTPATIENT)
Dept: LAB | Facility: HOSPITAL | Age: 30
End: 2025-01-27
Payer: COMMERCIAL

## 2025-01-27 NOTE — TELEPHONE ENCOUNTER
----- Message from Roly Bejarano sent at 1/26/2025 12:02 PM EST -----  Autonomic testing was unremarkable. Routine f/up  ----- Message -----  From: Mary Smart RN  Sent: 1/24/2025   8:38 AM EST  To: Roly Bejarano MD

## 2025-01-28 ENCOUNTER — TELEPHONE (OUTPATIENT)
Dept: CARDIOLOGY | Facility: HOSPITAL | Age: 30
End: 2025-01-28
Payer: COMMERCIAL

## 2025-01-28 NOTE — TELEPHONE ENCOUNTER
----- Message from Roly Bejarano sent at 1/26/2025 12:02 PM EST -----  Autonomic testing was unremarkable. Routine f/up  ----- Message -----  From: Mary Smart RN  Sent: 1/24/2025   8:38 AM EST  To: Roly eBjarano MD

## 2025-01-28 NOTE — TELEPHONE ENCOUNTER
Family Medicine Residency  Romana Daniel MD    Subjective:     Yumiko Lopez is a 25 y.o. female with CMH of hypothyroidism, depression and chronic left shoulder pain who presents for follow up of chronic conditions. Patient was seen by orthopedics, MRI of the right shoulder was unremarkable. She states that she did physical therapy but did not help. She reports that the pain on the right shoulder is intermittent, she has numbness and tingling of the entire arm in certain positions, especially if she sleeps on her right arm. She states that she also has neck pain, and lately has had left shoulder. Pain on left shoulder is achy and does not radiate. She admits to a lot of stress. 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   Regarding her depression, she is currently taking Zoloft 20 mg. She was admitted due to suicidal ideations on 07/13/22 and was supposed to follow up with linsey. She denies any SI/HI ideations today and is requesting Zoloft refill.   She is also requesting refill for her levothyroxine.     The following portions of the patient's history were reviewed and updated as appropriate: allergies, current medications, past family history, past medical history, past social history, past surgical history and problem list.    Past Medical Hx:  Past Medical History:   Diagnosis Date   • Anxiety    • Colitis    • Depression    • Personal history of cardiac arrhythmia    • SVT (supraventricular tachycardia) (HCC)     s/p ablation November 2015   • Thyroid dysfunction        Past Surgical Hx:  Past Surgical History:   Procedure Laterality Date   • ANKLE SURGERY  12/01/2010   • APPENDECTOMY  11/2012   • CARDIAC ABLATION  11/2015    for SVT       Current Meds:    Current Outpatient Medications:   •  famotidine (PEPCID) 20 MG tablet, TAKE 1 TABLET TWICE DAILY, Disp: 60 tablet, Rfl: 3  •  levonorgestrel-ethinyl estradiol (NORDETTE) 0.15-30 MG-MCG per tablet, Take 1 tablet by mouth  RN called patient, no answer, left message for patient to return call at [phone].    Daily., Disp: 28 tablet, Rfl: 12  •  levothyroxine (SYNTHROID, LEVOTHROID) 25 MCG tablet, Take 1 tablet by mouth Daily., Disp: 90 tablet, Rfl: 1  •  ondansetron (ZOFRAN) 4 MG tablet, Take 1 tablet by mouth Every 6 (Six) Hours As Needed for Nausea or Vomiting., Disp: 30 tablet, Rfl: 2  •  sertraline (ZOLOFT) 50 MG tablet, Take 1 tablet by mouth Daily., Disp: 30 tablet, Rfl: 0  •  tiZANidine (ZANAFLEX) 2 MG tablet, Please let patient know she needs to make an appointment for evaluation to continue refills.  Indications: Musculoskeletal Pain, Disp: 30 tablet, Rfl: 0    Allergies:  Allergies   Allergen Reactions   • Tamiflu [Oseltamivir Phosphate] Rash   • Vancomycin Itching       Family Hx:  Family History   Problem Relation Age of Onset   • Depression Mother    • Hypertension Father    • No Known Problems Sister    • No Known Problems Brother    • No Known Problems Maternal Grandmother    • Colon cancer Maternal Grandfather    • Uterine cancer Paternal Grandmother    • Deep vein thrombosis Other         Social History:  Social History     Socioeconomic History   • Marital status:    Tobacco Use   • Smoking status: Never   • Smokeless tobacco: Never   Vaping Use   • Vaping Use: Never used   Substance and Sexual Activity   • Alcohol use: No   • Drug use: No   • Sexual activity: Yes     Partners: Male     Comment: last pap smear 9/27/18 negative        Review of Systems  Review of Systems   Constitutional: Negative for fatigue and unexpected weight change.   Respiratory: Negative for chest tightness and shortness of breath.    Cardiovascular: Negative for chest pain and palpitations.   Gastrointestinal: Negative for abdominal pain, constipation, diarrhea and vomiting.   Endocrine: Negative for cold intolerance, heat intolerance, polydipsia and polyuria.   Musculoskeletal: Positive for arthralgias and neck pain. Negative for back pain and myalgias.   Skin: Negative for rash.   Neurological: Positive for numbness.  "Negative for dizziness, weakness, light-headedness and headaches.   Psychiatric/Behavioral: Negative for sleep disturbance and suicidal ideas.       Objective:     /60   Pulse 79   Temp 96.6 °F (35.9 °C)   Ht 160 cm (63\")   Wt 83.9 kg (185 lb)   SpO2 99%   BMI 32.77 kg/m²   Physical Exam  Vitals reviewed.   Constitutional:       General: She is not in acute distress.  HENT:      Head: Atraumatic.      Right Ear: External ear normal.      Left Ear: External ear normal.   Eyes:      Conjunctiva/sclera: Conjunctivae normal.   Cardiovascular:      Rate and Rhythm: Normal rate and regular rhythm.   Pulmonary:      Effort: Pulmonary effort is normal. No respiratory distress.      Breath sounds: Normal breath sounds.   Musculoskeletal:         General: Tenderness present. No swelling.      Comments: Right shoulder tenderness. ROM limited by pain.    Neurological:      Mental Status: She is alert and oriented to person, place, and time.   Psychiatric:         Mood and Affect: Mood normal.         Behavior: Behavior normal.          Assessment/Plan:     Diagnoses and all orders for this visit:    1. Chronic right shoulder pain (Primary)  -     tiZANidine (ZANAFLEX) 2 MG tablet; Please let patient know she needs to make an appointment for evaluation to continue refills.  Indications: Musculoskeletal Pain  Dispense: 30 tablet; Refill: 0  -     Patient has done PT and also been seen by ortho. MRI unremarkable.   -     Patient has mobic at home. Advised to take mobic since in seems musculoskeletal pain. Will refill mobic if needed    2. Depression, unspecified depression type  -     sertraline (ZOLOFT) 50 MG tablet; Take 1 tablet by mouth Daily.  Dispense: 30 tablet; Refill: 0  -     NO SI/HI  -     Patient was supposed to follow up with linsey. Advised to schedule an appointment ASAP  -     Return in 1 month for follow up.   -     Will do a PHQ-9 on next appointment     3. Neck pain  -     XR Spine Cervical 2 or " 3 View; Future    4. Hypothyroidism, unspecified type  -     levothyroxine (SYNTHROID, LEVOTHROID) 25 MCG tablet; Take 1 tablet by mouth Daily.  Dispense: 90 tablet; Refill: 1  -     Stable  -     Continue current regimen  -     Will recheck TSH and T4 on 04/23    5. Other fatigue  -     Vitamin D 25 hydroxy; Future  -     Hemoglobin A1c; Future    Patient expressed that she would like to lose weight. I will check her A1C and LFT to see if she has any comorbidities       · Rx changes: None     Follow-up:     Return in about 4 weeks (around 11/30/2022) for Annual.    Preventative:  Health Maintenance   Topic Date Due   • ANNUAL PHYSICAL  Never done   • COVID-19 Vaccine (3 - Booster for Moderna series) 11/21/2022 (Originally 10/26/2021)   • INFLUENZA VACCINE  03/31/2023 (Originally 8/1/2022)   • PAP SMEAR  06/01/2025   • TDAP/TD VACCINES (3 - Td or Tdap) 05/05/2031   • HEPATITIS C SCREENING  Completed   • HPV VACCINES  Completed   • Pneumococcal Vaccine 0-64  Aged Out   • CHLAMYDIA SCREENING  Discontinued       Alcohol use:  reports no history of alcohol use.  Nicotine status  reports that she has never smoked. She has never used smokeless tobacco.     Goals     •  Reduce fat intake/eat healthier (pt-stated)       Barriers:  Likes [employer], but will try to eat it less frequently, e.g. Once every 2 weeks instead of weekly            RISK SCORE: 3    Signature        Romana Luciano MD PGY2  [facility]  [address] [place] [address]  Office: [phone]    This document has been electronically signed by Romana Daniel MD on November 2, 2022 15:04 CDT

## 2025-01-28 NOTE — PROGRESS NOTES
This patient has been enrolled in [facility] for management and support of depression and/or anxiety.  [facility] has attempted to reach patient on several occasions without success.  At this time, patient has not contacted [facility], thus [facility] will discharge patient from [facility] and end episode of care.  If PCP feels patient would benefit from [facility] in the future, a new referral can be placed.

## 2025-01-31 ENCOUNTER — DOCUMENTATION (OUTPATIENT)
Dept: PRIMARY CARE | Facility: CLINIC | Age: 30
End: 2025-01-31
Payer: COMMERCIAL

## 2025-01-31 DIAGNOSIS — F41.9 ANXIETY: Primary | ICD-10-CM

## 2025-02-03 LAB
LABORATORY COMMENT REPORT: NORMAL
PATH REPORT.FINAL DX SPEC: NORMAL
PATH REPORT.GROSS SPEC: NORMAL
PATH REPORT.RELEVANT HX SPEC: NORMAL
PATH REPORT.TOTAL CANCER: NORMAL
RESIDENT REVIEW: NORMAL

## 2025-02-03 NOTE — ASSESSMENT & PLAN NOTE
Continue with Escitalopram  Encouraging follow up with Behavioral Health Collaborative Care Team       Patient Health Questionnaire-2 Score: 3 (1/10/2025  3:46 PM).  This indicates a positive screen but may not meet the criteria for a clinical depression diagnosis. Symptoms were reviewed with Elmira.  Follow-up within the next 3 months is recommended to re-assess symptoms and monitor mental health status IF SYMPTOMS CHANGE OR WORSEN.

## 2025-02-12 ENCOUNTER — APPOINTMENT (OUTPATIENT)
Dept: OBSTETRICS AND GYNECOLOGY | Facility: CLINIC | Age: 30
End: 2025-02-12
Payer: COMMERCIAL

## 2025-02-12 ENCOUNTER — PREP FOR PROCEDURE (OUTPATIENT)
Dept: OBSTETRICS AND GYNECOLOGY | Facility: CLINIC | Age: 30
End: 2025-02-12

## 2025-02-12 VITALS
HEIGHT: 60 IN | DIASTOLIC BLOOD PRESSURE: 60 MMHG | WEIGHT: 119 LBS | SYSTOLIC BLOOD PRESSURE: 98 MMHG | BODY MASS INDEX: 23.36 KG/M2

## 2025-02-12 DIAGNOSIS — N80.9 ENDOMETRIOSIS: Primary | ICD-10-CM

## 2025-02-12 PROCEDURE — 3008F BODY MASS INDEX DOCD: CPT | Performed by: OBSTETRICS & GYNECOLOGY

## 2025-02-12 PROCEDURE — 1036F TOBACCO NON-USER: CPT | Performed by: OBSTETRICS & GYNECOLOGY

## 2025-02-12 PROCEDURE — 99214 OFFICE O/P EST MOD 30 MIN: CPT | Performed by: OBSTETRICS & GYNECOLOGY

## 2025-02-12 ASSESSMENT — ENCOUNTER SYMPTOMS
MYALGIAS: 1
FLATUS: 1
CONSTIPATION: 1
ABDOMINAL PAIN: 1

## 2025-02-12 NOTE — PROGRESS NOTES
Answers submitted by the patient for this visit:  Abdominal Pain Questionnaire (Submitted on 2/12/2025)  Chief Complaint: Abdominal pain  Chronicity: recurrent  Onset: more than 1 month ago  Onset quality: sudden  Frequency: every several days  Episode duration: 3 Days  Progression since onset: waxing and waning  Pain location: LLQ, periumbilical region, right flank  Pain - numeric: 8/10  Pain quality: cramping, a sensation of fullness, sharp, tearing  Radiates to: LLQ, epigastric region, chest  constipation: Yes  flatus: Yes  myalgias: Yes  Aggravated by: being still, bowel movement  Relieved by: certain positions, recumbency  Diagnostic workup: CT scan, upper endoscopy

## 2025-02-12 NOTE — H&P (VIEW-ONLY)
Elmira Harrison is a 29 y.o. female who presents with a chief complaint of Pre-op Visit      SUBJECTIVE  Patient presents for preop for her CO2 laser laparoscopy.  We went over the risk, the benefits, the major minor complications, she signed the consent form.  Went over the surgery itself, hospital course, the recovery    Past Medical History:   Diagnosis Date    23 weeks gestation of pregnancy ([facility]) 04/07/2017    23 weeks gestation of pregnancy    Acute gastric ulcer without hemorrhage or perforation 12/17/2015    Acute gastric ulcer    Acute upper respiratory infection, unspecified 10/01/2020    Acute upper respiratory infection    Allergic purpura (CMS-HCC) 12/11/2013    Henoch-Schonlein purpura    Anemia     Chest pain, unspecified 12/11/2013    Chest pain    Contusion of unspecified back wall of thorax, initial encounter 12/11/2013    Contusion of back    Edema, unspecified 12/11/2013    Edema    Endometriosis     Fibroid     Gastro-esophageal reflux disease without esophagitis 12/11/2013    Esophageal reflux    Intestinal malabsorption, unspecified 12/11/2013    Malabsorption syndrome    Iron deficiency anemia secondary to blood loss (chronic) 12/11/2013    Iron deficiency anemia due to chronic blood loss    Migraine     Nausea 12/11/2013    Nausea    Nonspecific lymphadenitis, unspecified 12/11/2013    Lymphadenitis    Other conditions influencing health status 12/11/2013    Endomyometritis    Other conditions influencing health status 12/11/2013    Inflammatory Myopathy (Myositis)    Other conditions influencing health status 12/11/2013    Attention-deficit Hyperactivity Disorder    Other conditions influencing health status     History of burning on urination    Other disorders of intestinal carbohydrate absorption 12/11/2013    Glucose intolerance (malabsorption)    Other fatigue 12/11/2013    Fatigue    Other pulmonary embolism without acute cor pulmonale 12/11/2013    Pulmonary embolism    Pain in  unspecified shoulder 2013    Pain, joint, shoulder    Personal history of other diseases of the female genital tract 2016    History of endometriosis    Personal history of other diseases of the respiratory system 10/25/2016    History of acute pharyngitis    Personal history of other specified conditions 2016    History of abnormal weight loss    Personal history of other specified conditions 2016    History of fatigue    Phlebitis and thrombophlebitis of unspecified site 2013    Thrombophlebitis    Sciatica, unspecified side 2013    Sciatica    Unspecified abdominal pain 2013    Abdominal pain    Unspecified sprain of unspecified wrist, initial encounter 05/10/2017    Wrist sprain    Zoster without complications 2013    Herpes zoster     Past Surgical History:   Procedure Laterality Date    APPENDECTOMY      CHOLECYSTECTOMY  2016    Cholecystectomy Laparoscopic    LAPAROSCOPY DIAGNOSTIC / BIOPSY / ASPIRATION / LYSIS  2016    Exploratory Laparoscopy     Social History     Socioeconomic History    Marital status: Single   Tobacco Use    Smoking status: Former     Types: Cigarettes    Smokeless tobacco: Never   Vaping Use    Vaping status: Every Day    Substances: Nicotine    Devices: Disposable   Substance and Sexual Activity    Alcohol use: Not Currently    Drug use: Never    Sexual activity: Yes     Partners: Male     Birth control/protection: None     No family history on file.    OB History    Para Term  AB Living   4 1 1 0 3 1   SAB IAB Ectopic Multiple Live Births   1 1 1 0 1      # Outcome Date GA Lbr Wesley/2nd Weight Sex Type Anes PTL Lv   4 SAB 2024           3 Ectopic 2019           2 Term 2017    M Vag-Spont   MELONIE   1 IAB                OBJECTIVE  Allergies   Allergen Reactions    Penicillins Rash, Unknown and Nausea/vomiting     Other reaction(s): Unknown    Valium [Diazepam] Hives and Palpitations    Acetaminophen Itching     Metoprolol Hives      (Not in a hospital admission)       Review of Systems  History obtained from the patient  General ROS: negative  Psychological ROS: negative  Gastrointestinal ROS: no abdominal pain, change in bowel habits, or black or bloody stools  Musculoskeletal ROS: negative  Physical Exam  General Appearance: awake, alert, oriented, in no acute distress, well developed, well nourished, and in no acute distress  Skin: there are no suspicious lesions or rashes of concern, skin color, texture, turgor are normal; there are no bruises, rashes or lesions.  Head/Face: NCAT  Eyes: No gross abnormalities., PERRL, and EOMI  Neck: neck- supple, no mass, non-tender  Back: no pain to palpation  Abdomen: Soft, non-tender, normal bowel sounds; no bruits, organomegaly or masses.  Extremities: Extremities warm to touch, pink, with no edema.  Musculoskeletal: negative    BP 98/60 (BP Location: Left arm, Patient Position: Sitting, BP Cuff Size: Adult)   Ht 1.524 m (5')   Wt 54 kg (119 lb)   LMP 02/08/2025   BMI 23.24 kg/m²    Problem List Items Addressed This Visit    None  Visit Diagnoses       Endometriosis    -  Primary         Consent signed and ready for surgery      Answers submitted by the patient for this visit:  Abdominal Pain Questionnaire (Submitted on 2/12/2025)  Chief Complaint: Abdominal pain  Chronicity: recurrent  Onset: more than 1 month ago  Onset quality: sudden  Frequency: every several days  Episode duration: 3 Days  Progression since onset: waxing and waning  Pain location: LLQ, periumbilical region, right flank  Pain - numeric: 8/10  Pain quality: cramping, a sensation of fullness, sharp, tearing  Radiates to: LLQ, epigastric region, chest  constipation: Yes  flatus: Yes  myalgias: Yes  Aggravated by: being still, bowel movement  Relieved by: certain positions, recumbency  Diagnostic workup: CT scan, upper endoscopy

## 2025-02-12 NOTE — PROGRESS NOTES
Elmira Harrison is a 29 y.o. female who presents with a chief complaint of Pre-op Visit      SUBJECTIVE  Patient presents for preop for her CO2 laser laparoscopy.  We went over the risk, the benefits, the major minor complications, she signed the consent form.  Went over the surgery itself, hospital course, the recovery    Past Medical History:   Diagnosis Date    23 weeks gestation of pregnancy ([facility]) 04/07/2017    23 weeks gestation of pregnancy    Acute gastric ulcer without hemorrhage or perforation 12/17/2015    Acute gastric ulcer    Acute upper respiratory infection, unspecified 10/01/2020    Acute upper respiratory infection    Allergic purpura (CMS-HCC) 12/11/2013    Henoch-Schonlein purpura    Anemia     Chest pain, unspecified 12/11/2013    Chest pain    Contusion of unspecified back wall of thorax, initial encounter 12/11/2013    Contusion of back    Edema, unspecified 12/11/2013    Edema    Endometriosis     Fibroid     Gastro-esophageal reflux disease without esophagitis 12/11/2013    Esophageal reflux    Intestinal malabsorption, unspecified 12/11/2013    Malabsorption syndrome    Iron deficiency anemia secondary to blood loss (chronic) 12/11/2013    Iron deficiency anemia due to chronic blood loss    Migraine     Nausea 12/11/2013    Nausea    Nonspecific lymphadenitis, unspecified 12/11/2013    Lymphadenitis    Other conditions influencing health status 12/11/2013    Endomyometritis    Other conditions influencing health status 12/11/2013    Inflammatory Myopathy (Myositis)    Other conditions influencing health status 12/11/2013    Attention-deficit Hyperactivity Disorder    Other conditions influencing health status     History of burning on urination    Other disorders of intestinal carbohydrate absorption 12/11/2013    Glucose intolerance (malabsorption)    Other fatigue 12/11/2013    Fatigue    Other pulmonary embolism without acute cor pulmonale 12/11/2013    Pulmonary embolism    Pain in  unspecified shoulder 2013    Pain, joint, shoulder    Personal history of other diseases of the female genital tract 2016    History of endometriosis    Personal history of other diseases of the respiratory system 10/25/2016    History of acute pharyngitis    Personal history of other specified conditions 2016    History of abnormal weight loss    Personal history of other specified conditions 2016    History of fatigue    Phlebitis and thrombophlebitis of unspecified site 2013    Thrombophlebitis    Sciatica, unspecified side 2013    Sciatica    Unspecified abdominal pain 2013    Abdominal pain    Unspecified sprain of unspecified wrist, initial encounter 05/10/2017    Wrist sprain    Zoster without complications 2013    Herpes zoster     Past Surgical History:   Procedure Laterality Date    APPENDECTOMY      CHOLECYSTECTOMY  2016    Cholecystectomy Laparoscopic    LAPAROSCOPY DIAGNOSTIC / BIOPSY / ASPIRATION / LYSIS  2016    Exploratory Laparoscopy     Social History     Socioeconomic History    Marital status: Single   Tobacco Use    Smoking status: Former     Types: Cigarettes    Smokeless tobacco: Never   Vaping Use    Vaping status: Every Day    Substances: Nicotine    Devices: Disposable   Substance and Sexual Activity    Alcohol use: Not Currently    Drug use: Never    Sexual activity: Yes     Partners: Male     Birth control/protection: None     No family history on file.    OB History    Para Term  AB Living   4 1 1 0 3 1   SAB IAB Ectopic Multiple Live Births   1 1 1 0 1      # Outcome Date GA Lbr Wesley/2nd Weight Sex Type Anes PTL Lv   4 SAB 2024           3 Ectopic 2019           2 Term 2017    M Vag-Spont   MELONIE   1 IAB                OBJECTIVE  Allergies   Allergen Reactions    Penicillins Rash, Unknown and Nausea/vomiting     Other reaction(s): Unknown    Valium [Diazepam] Hives and Palpitations    Acetaminophen Itching     Metoprolol Hives      (Not in a hospital admission)       Review of Systems  History obtained from the patient  General ROS: negative  Psychological ROS: negative  Gastrointestinal ROS: no abdominal pain, change in bowel habits, or black or bloody stools  Musculoskeletal ROS: negative  Physical Exam  General Appearance: awake, alert, oriented, in no acute distress, well developed, well nourished, and in no acute distress  Skin: there are no suspicious lesions or rashes of concern, skin color, texture, turgor are normal; there are no bruises, rashes or lesions.  Head/Face: NCAT  Eyes: No gross abnormalities., PERRL, and EOMI  Neck: neck- supple, no mass, non-tender  Back: no pain to palpation  Abdomen: Soft, non-tender, normal bowel sounds; no bruits, organomegaly or masses.  Extremities: Extremities warm to touch, pink, with no edema.  Musculoskeletal: negative    BP 98/60 (BP Location: Left arm, Patient Position: Sitting, BP Cuff Size: Adult)   Ht 1.524 m (5')   Wt 54 kg (119 lb)   LMP 02/08/2025   BMI 23.24 kg/m²    Problem List Items Addressed This Visit    None  Visit Diagnoses       Endometriosis    -  Primary         Consent signed and ready for surgery      Answers submitted by the patient for this visit:  Abdominal Pain Questionnaire (Submitted on 2/12/2025)  Chief Complaint: Abdominal pain  Chronicity: recurrent  Onset: more than 1 month ago  Onset quality: sudden  Frequency: every several days  Episode duration: 3 Days  Progression since onset: waxing and waning  Pain location: LLQ, periumbilical region, right flank  Pain - numeric: 8/10  Pain quality: cramping, a sensation of fullness, sharp, tearing  Radiates to: LLQ, epigastric region, chest  constipation: Yes  flatus: Yes  myalgias: Yes  Aggravated by: being still, bowel movement  Relieved by: certain positions, recumbency  Diagnostic workup: CT scan, upper endoscopy

## 2025-02-14 ENCOUNTER — OFFICE VISIT (OUTPATIENT)
Dept: CARDIOLOGY | Facility: HOSPITAL | Age: 30
End: 2025-02-14
Payer: COMMERCIAL

## 2025-02-14 VITALS
DIASTOLIC BLOOD PRESSURE: 70 MMHG | SYSTOLIC BLOOD PRESSURE: 102 MMHG | WEIGHT: 119 LBS | HEIGHT: 60 IN | BODY MASS INDEX: 23.36 KG/M2 | HEART RATE: 73 BPM

## 2025-02-14 DIAGNOSIS — N80.9 ENDOMETRIOSIS: ICD-10-CM

## 2025-02-14 DIAGNOSIS — R00.2 PALPITATIONS: ICD-10-CM

## 2025-02-14 DIAGNOSIS — Z01.810 PREOPERATIVE CARDIOVASCULAR EXAMINATION: Primary | ICD-10-CM

## 2025-02-14 LAB
ATRIAL RATE: 76 BPM
P AXIS: 51 DEGREES
P OFFSET: 195 MS
P ONSET: 153 MS
PR INTERVAL: 136 MS
Q ONSET: 221 MS
QRS COUNT: 13 BEATS
QRS DURATION: 78 MS
QT INTERVAL: 384 MS
QTC CALCULATION(BAZETT): 432 MS
QTC FREDERICIA: 415 MS
R AXIS: 87 DEGREES
T AXIS: 44 DEGREES
T OFFSET: 413 MS
VENTRICULAR RATE: 76 BPM

## 2025-02-14 PROCEDURE — 1036F TOBACCO NON-USER: CPT | Performed by: PHYSICIAN ASSISTANT

## 2025-02-14 PROCEDURE — 99213 OFFICE O/P EST LOW 20 MIN: CPT | Performed by: PHYSICIAN ASSISTANT

## 2025-02-14 PROCEDURE — 93005 ELECTROCARDIOGRAM TRACING: CPT | Performed by: PHYSICIAN ASSISTANT

## 2025-02-14 PROCEDURE — 3008F BODY MASS INDEX DOCD: CPT | Performed by: PHYSICIAN ASSISTANT

## 2025-02-14 RX ORDER — MEGESTROL ACETATE 20 MG/1
20 TABLET ORAL AS NEEDED
COMMUNITY
Start: 2025-02-14

## 2025-02-14 ASSESSMENT — ENCOUNTER SYMPTOMS
PALPITATIONS: 0
DIARRHEA: 0
FEVER: 0
DYSURIA: 0
ORTHOPNEA: 0
ABDOMINAL PAIN: 1
NAUSEA: 0
SHORTNESS OF BREATH: 0
VOMITING: 0
WEAKNESS: 0
WHEEZING: 0

## 2025-02-14 NOTE — PROGRESS NOTES
Cardiology Follow Up  Chief Complaint:   Patient presents today for cardiovascular risk assessment.        History Of Present Illness:    Elmira Harrison is a 29 y.o. female who is referred to me for palpitation. Patient reports since patient had COVID in 2021 and has noted palpitation and lightheadedness. Patient was following with different cardiology but couldn't get apt with them so requested to see us. She had tilt table testing and holter and reports there was POTS?dysautonomia but I have no records to review. Patient reports she had miscarriage earlier this year and since then she has been having more palpitation and lightheadedness. Then started getting palpitation throughout the day   Gets lightheaded. NO syncope.  No chest pain or dyspnea. No HF symptoms.      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   Fluid intake-40 ounce in AM and added salt to food         Review of Systems:  Otherwise, limited cardiovascular review of systems is negative.   2-14-25: Is a 29-year-old female patient known to Dr. Bejarano.  She presents today for preoperative cardiovascular risk assessment.  Patient needs to have laparoscopic ablation of endometriosis.  RCRI score is 0.  Recently the patient had a Holter monitor that is been unremarkable.  She had heads-up tilt table testing that was negative.  She has had an echocardiogram showing structurally normal heart.  Patient considered low risk for this upcoming procedure.       Last Recorded Vitals:  Vitals:    02/14/25 1442   BP: 102/70   BP Location: Left arm   Patient Position: Sitting   BP Cuff Size: Adult   Pulse: 73   Weight: 54 kg (119 lb)   Height: 1.524 m (5')       Past Medical History:  She has a past medical history of 23 weeks gestation of pregnancy  ([facility]) (04/07/2017), Acute gastric ulcer without hemorrhage or perforation (12/17/2015), Acute upper respiratory infection, unspecified (10/01/2020), Allergic purpura (CMS-HCC) (12/11/2013), Anemia, Chest pain, unspecified (12/11/2013), Contusion of unspecified back wall of thorax, initial encounter (12/11/2013), Edema, unspecified (12/11/2013), Endometriosis, Fibroid, Gastro-esophageal reflux disease without esophagitis (12/11/2013), Intestinal malabsorption, unspecified (12/11/2013), 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), Migraine, Nausea (12/11/2013), Nonspecific lymphadenitis, unspecified (12/11/2013), Other conditions influencing health status (12/11/2013), Other conditions influencing health status (12/11/2013), Other conditions influencing health status (12/11/2013), Other conditions influencing health status, Other disorders of intestinal carbohydrate absorption (12/11/2013), Other fatigue (12/11/2013), Other pulmonary embolism without acute cor pulmonale (12/11/2013), Pain in unspecified shoulder (12/11/2013), Personal history of other diseases of the female genital tract (02/22/2016), Personal history of other diseases of the respiratory system (10/25/2016), Personal history of other specified conditions (11/30/2016), Personal history of other specified conditions (11/04/2016), Phlebitis and thrombophlebitis of unspecified site (12/11/2013), Sciatica, unspecified side (12/11/2013), Unspecified abdominal pain (12/11/2013), Unspecified sprain of unspecified wrist, initial encounter (05/10/2017), and Zoster without complications (12/11/2013).    Past Surgical History:  She has a past surgical history that includes Laparoscopy diagnostic / biopsy / aspiration / lysis (02/22/2016); Cholecystectomy (06/03/2016); and Appendectomy (2022).      Social History:  She reports that she has quit smoking. Her smoking use included cigarettes. She has never used smokeless tobacco. She  reports that she does not currently use alcohol. She reports that she does not use drugs.    Family History:  No family history on file.     Allergies:  Penicillins, Valium [diazepam], Acetaminophen, and Metoprolol    Outpatient Medications:  Current Outpatient Medications   Medication Instructions    ALPRAZolam (XANAX) 0.25 mg, oral, 2 times daily PRN    clobetasol (Temovate) 0.05 % external solution Apply topically.    escitalopram (LEXAPRO) 20 mg, oral, Daily    megestrol (MEGACE) 20 mg, oral, As needed    pantoprazole (PROTONIX) 40 mg, oral, Daily, Do not crush, chew, or split.     Review of Systems   Constitutional: Negative for fever and malaise/fatigue.   Cardiovascular:  Negative for chest pain, orthopnea and palpitations.   Respiratory:  Negative for shortness of breath and wheezing.    Skin:  Negative for itching and rash.   Gastrointestinal:  Positive for abdominal pain. Negative for diarrhea, nausea and vomiting.   Genitourinary:  Negative for dysuria.        Renal stones; has endometriosis   Neurological:  Negative for weakness.      Physical Exam  Constitutional:       General: She is not in acute distress.     Appearance: Normal appearance.   HENT:      Mouth/Throat:      Mouth: Mucous membranes are moist.   Neck:      Comments: No JVD or bruit  Cardiovascular:      Rate and Rhythm: Normal rate and regular rhythm.      Heart sounds: Normal heart sounds.   Pulmonary:      Effort: Pulmonary effort is normal.      Breath sounds: Normal breath sounds.   Abdominal:      General: Bowel sounds are normal.      Palpations: Abdomen is soft.      Tenderness: There is no abdominal tenderness.   Musculoskeletal:      Right lower leg: No edema.      Left lower leg: No edema.   Skin:     General: Skin is warm and dry.   Neurological:      Mental Status: She is alert and oriented to person, place, and time.   Psychiatric:         Behavior: Behavior is cooperative.           Last Labs:  CBC -  Lab Results   Component  "Value Date    WBC 8.7 09/23/2024    HGB 13.2 09/23/2024    HCT 39.8 09/23/2024    MCV 90 09/23/2024     09/23/2024       CMP -  Lab Results   Component Value Date    CALCIUM 9.6 09/23/2024    PROT 7.6 09/23/2024    ALBUMIN 5.1 (H) 09/23/2024    AST 12 09/23/2024    ALT 9 09/23/2024    ALKPHOS 32 (L) 09/23/2024    BILITOT 0.6 09/23/2024       LIPID PANEL -   Lab Results   Component Value Date    CHOL 185 01/14/2023    TRIG 76 01/14/2023    HDL 48.0 01/14/2023    CHHDL 3.9 01/14/2023    LDLF 122 (H) 01/14/2023    VLDL 15 01/14/2023       RENAL FUNCTION PANEL -   Lab Results   Component Value Date    GLUCOSE 78 09/23/2024     09/23/2024    K 4.0 09/23/2024     09/23/2024    CO2 24 09/23/2024    ANIONGAP 14 09/23/2024    BUN 7 09/23/2024    CREATININE 0.66 09/23/2024    CALCIUM 9.6 09/23/2024    ALBUMIN 5.1 (H) 09/23/2024        No results found for: \"BNP\", \"HGBA1C\"    Last Cardiology Tests:    Echo:  Transthoracic Echo (TTE) Complete 12/30/2024--CONCLUSIONS:   1. The left ventricular systolic function is normal, with a Siu's biplane calculated ejection fraction of 58%.   2. There is normal right ventricular global systolic function.   3. Aortic valve stenosis is not present.   4. Left Ventricular Global Longitudinal Strain - 19.7 %.    Ejection Fractions:  EF   Date/Time Value Ref Range Status   12/30/2024 11:40 AM 58 %        Lab review: I have personally reviewed the laboratory result(s)     Assessment/Plan   Problem List Items Addressed This Visit             ICD-10-CM       Cardiac and Vasculature    Palpitations R00.2    Relevant Orders    ECG 12 Lead (Completed)    Preoperative cardiovascular examination - Primary Z01.810    Relevant Orders    ECG 12 Lead (Completed)     Other Visit Diagnoses         Codes    Endometriosis     N80.9    Relevant Medications    megestrol (Megace) 20 mg tablet        Preoperative cardiovascular risk assessment--patient's RCRI is 0.  This would indicate that " she is at low risk for major adverse cardiac events.  Patient verbalized understanding.  And she has had a battery of recent testing due to palpitations and concern for dysautonomia.  All testing has been negative.  The patient stays adequately hydrated and has been exercising at a local gym and feeling well.  She has had no syncope or falling.  Occasionally still gets a little palpitation but she does have some PACs noted on her Holter monitoring.  She is on no specific cardiac medications.  No further testing would be necessary at this time and again she is considered low risk.  Patient advised to make sure that she is very well-hydrated going into the surgery.  Will send appropriate information over to Dr. Padgett.   Patient will be back to see Dr. Bejarano in late March and certainly if there is any change or concerns before then she is asked to call the office.      Ti Slater PA-C  2/14/2025  3:15 PM

## 2025-02-14 NOTE — PATIENT INSTRUCTIONS
You can contact Dr. Ruben Hughes or Dr. Oliver Wilson regarding the kidney stones.    No further testing needed before your procedure.  Make sure very well hydrated before the procedure.

## 2025-02-21 ENCOUNTER — APPOINTMENT (OUTPATIENT)
Dept: NEUROLOGY | Facility: HOSPITAL | Age: 30
End: 2025-02-21
Payer: COMMERCIAL

## 2025-02-25 ENCOUNTER — TELEPHONE (OUTPATIENT)
Facility: CLINIC | Age: 30
End: 2025-02-25
Payer: COMMERCIAL

## 2025-02-25 ENCOUNTER — ANESTHESIA EVENT (OUTPATIENT)
Dept: OPERATING ROOM | Facility: HOSPITAL | Age: 30
End: 2025-02-25
Payer: COMMERCIAL

## 2025-02-26 ENCOUNTER — ANESTHESIA (OUTPATIENT)
Dept: OPERATING ROOM | Facility: HOSPITAL | Age: 30
End: 2025-02-26
Payer: COMMERCIAL

## 2025-02-26 ENCOUNTER — HOSPITAL ENCOUNTER (OUTPATIENT)
Facility: HOSPITAL | Age: 30
Setting detail: OUTPATIENT SURGERY
Discharge: HOME | End: 2025-02-26
Attending: OBSTETRICS & GYNECOLOGY | Admitting: OBSTETRICS & GYNECOLOGY
Payer: COMMERCIAL

## 2025-02-26 VITALS
SYSTOLIC BLOOD PRESSURE: 117 MMHG | DIASTOLIC BLOOD PRESSURE: 70 MMHG | HEART RATE: 85 BPM | BODY MASS INDEX: 23.68 KG/M2 | TEMPERATURE: 97.5 F | RESPIRATION RATE: 16 BRPM | WEIGHT: 120.59 LBS | OXYGEN SATURATION: 99 % | HEIGHT: 60 IN

## 2025-02-26 DIAGNOSIS — R10.2 PELVIC PAIN IN FEMALE: Primary | ICD-10-CM

## 2025-02-26 LAB
ABO GROUP BLD: NORMAL
B-HCG UR QL: NEGATIVE
BASOPHILS # BLD AUTO: 52 CELLS/UL (ref 0–200)
BASOPHILS NFR BLD AUTO: 0.7 %
EOSINOPHIL # BLD AUTO: 37 CELLS/UL (ref 15–500)
EOSINOPHIL NFR BLD AUTO: 0.5 %
ERYTHROCYTE [DISTWIDTH] IN BLOOD BY AUTOMATED COUNT: 12 % (ref 11–15)
HCT VFR BLD AUTO: 40.4 % (ref 35–45)
HGB BLD-MCNC: 13.2 G/DL (ref 11.7–15.5)
LYMPHOCYTES # BLD AUTO: 2183 CELLS/UL (ref 850–3900)
LYMPHOCYTES NFR BLD AUTO: 29.5 %
MCH RBC QN AUTO: 29.1 PG (ref 27–33)
MCHC RBC AUTO-ENTMCNC: 32.7 G/DL (ref 32–36)
MCV RBC AUTO: 89.2 FL (ref 80–100)
MONOCYTES # BLD AUTO: 370 CELLS/UL (ref 200–950)
MONOCYTES NFR BLD AUTO: 5 %
NEUTROPHILS # BLD AUTO: 4758 CELLS/UL (ref 1500–7800)
NEUTROPHILS NFR BLD AUTO: 64.3 %
PLATELET # BLD AUTO: 319 THOUSAND/UL (ref 140–400)
PMV BLD REES-ECKER: 9.1 FL (ref 7.5–12.5)
PREGNANCY TEST URINE, POC: NEGATIVE
QUEST DIFF COMMENT: NORMAL
RBC # BLD AUTO: 4.53 MILLION/UL (ref 3.8–5.1)
RH BLD: NORMAL
WBC # BLD AUTO: 7.4 THOUSAND/UL (ref 3.8–10.8)

## 2025-02-26 PROCEDURE — 3600000008 HC OR TIME - EACH INCREMENTAL 1 MINUTE - PROCEDURE LEVEL THREE: Performed by: OBSTETRICS & GYNECOLOGY

## 2025-02-26 PROCEDURE — 3700000002 HC GENERAL ANESTHESIA TIME - EACH INCREMENTAL 1 MINUTE: Performed by: OBSTETRICS & GYNECOLOGY

## 2025-02-26 PROCEDURE — A58662 PR LAP,FULGURATE/EXCISE LESIONS: Performed by: ANESTHESIOLOGY

## 2025-02-26 PROCEDURE — 7100000010 HC PHASE TWO TIME - EACH INCREMENTAL 1 MINUTE: Performed by: OBSTETRICS & GYNECOLOGY

## 2025-02-26 PROCEDURE — 58662 LAPAROSCOPY EXCISE LESIONS: CPT | Performed by: PHYSICIAN ASSISTANT

## 2025-02-26 PROCEDURE — 81025 URINE PREGNANCY TEST: CPT | Performed by: OBSTETRICS & GYNECOLOGY

## 2025-02-26 PROCEDURE — 7100000001 HC RECOVERY ROOM TIME - INITIAL BASE CHARGE: Performed by: OBSTETRICS & GYNECOLOGY

## 2025-02-26 PROCEDURE — 3600000003 HC OR TIME - INITIAL BASE CHARGE - PROCEDURE LEVEL THREE: Performed by: OBSTETRICS & GYNECOLOGY

## 2025-02-26 PROCEDURE — 2720000007 HC OR 272 NO HCPCS: Performed by: OBSTETRICS & GYNECOLOGY

## 2025-02-26 PROCEDURE — 2500000004 HC RX 250 GENERAL PHARMACY W/ HCPCS (ALT 636 FOR OP/ED)

## 2025-02-26 PROCEDURE — 7100000002 HC RECOVERY ROOM TIME - EACH INCREMENTAL 1 MINUTE: Performed by: OBSTETRICS & GYNECOLOGY

## 2025-02-26 PROCEDURE — 2500000004 HC RX 250 GENERAL PHARMACY W/ HCPCS (ALT 636 FOR OP/ED): Mod: JZ | Performed by: ANESTHESIOLOGY

## 2025-02-26 PROCEDURE — 7100000009 HC PHASE TWO TIME - INITIAL BASE CHARGE: Performed by: OBSTETRICS & GYNECOLOGY

## 2025-02-26 PROCEDURE — 58662 LAPAROSCOPY EXCISE LESIONS: CPT | Performed by: OBSTETRICS & GYNECOLOGY

## 2025-02-26 PROCEDURE — A58662 PR LAP,FULGURATE/EXCISE LESIONS

## 2025-02-26 PROCEDURE — 2500000001 HC RX 250 WO HCPCS SELF ADMINISTERED DRUGS (ALT 637 FOR MEDICARE OP): Performed by: OBSTETRICS & GYNECOLOGY

## 2025-02-26 PROCEDURE — 2500000004 HC RX 250 GENERAL PHARMACY W/ HCPCS (ALT 636 FOR OP/ED): Performed by: ANESTHESIOLOGY

## 2025-02-26 PROCEDURE — 3700000001 HC GENERAL ANESTHESIA TIME - INITIAL BASE CHARGE: Performed by: OBSTETRICS & GYNECOLOGY

## 2025-02-26 PROCEDURE — 2500000001 HC RX 250 WO HCPCS SELF ADMINISTERED DRUGS (ALT 637 FOR MEDICARE OP): Performed by: ANESTHESIOLOGY

## 2025-02-26 PROCEDURE — 2500000002 HC RX 250 W HCPCS SELF ADMINISTERED DRUGS (ALT 637 FOR MEDICARE OP, ALT 636 FOR OP/ED): Performed by: ANESTHESIOLOGY

## 2025-02-26 PROCEDURE — 2500000004 HC RX 250 GENERAL PHARMACY W/ HCPCS (ALT 636 FOR OP/ED): Performed by: OBSTETRICS & GYNECOLOGY

## 2025-02-26 RX ORDER — ONDANSETRON HYDROCHLORIDE 2 MG/ML
4 INJECTION, SOLUTION INTRAVENOUS ONCE AS NEEDED
Status: DISCONTINUED | OUTPATIENT
Start: 2025-02-26 | End: 2025-02-26 | Stop reason: HOSPADM

## 2025-02-26 RX ORDER — ALBUTEROL SULFATE 0.83 MG/ML
2.5 SOLUTION RESPIRATORY (INHALATION) ONCE AS NEEDED
Status: DISCONTINUED | OUTPATIENT
Start: 2025-02-26 | End: 2025-02-26 | Stop reason: HOSPADM

## 2025-02-26 RX ORDER — APREPITANT 40 MG/1
CAPSULE ORAL AS NEEDED
Status: DISCONTINUED | OUTPATIENT
Start: 2025-02-26 | End: 2025-02-26

## 2025-02-26 RX ORDER — OXYCODONE HYDROCHLORIDE 5 MG/1
5 TABLET ORAL EVERY 4 HOURS PRN
Status: DISCONTINUED | OUTPATIENT
Start: 2025-02-26 | End: 2025-02-26 | Stop reason: HOSPADM

## 2025-02-26 RX ORDER — DROPERIDOL 2.5 MG/ML
0.62 INJECTION, SOLUTION INTRAMUSCULAR; INTRAVENOUS ONCE AS NEEDED
Status: DISCONTINUED | OUTPATIENT
Start: 2025-02-26 | End: 2025-02-26 | Stop reason: HOSPADM

## 2025-02-26 RX ORDER — ONDANSETRON HYDROCHLORIDE 2 MG/ML
INJECTION, SOLUTION INTRAVENOUS AS NEEDED
Status: DISCONTINUED | OUTPATIENT
Start: 2025-02-26 | End: 2025-02-26

## 2025-02-26 RX ORDER — SCOPOLAMINE 1 MG/3D
PATCH, EXTENDED RELEASE TRANSDERMAL AS NEEDED
Status: DISCONTINUED | OUTPATIENT
Start: 2025-02-26 | End: 2025-02-26

## 2025-02-26 RX ORDER — ASPIRIN 81 MG
100 TABLET, DELAYED RELEASE (ENTERIC COATED) ORAL 2 TIMES DAILY
Qty: 10 TABLET | Refills: 0 | Status: SHIPPED | OUTPATIENT
Start: 2025-02-26 | End: 2025-03-03

## 2025-02-26 RX ORDER — FENTANYL CITRATE 50 UG/ML
INJECTION, SOLUTION INTRAMUSCULAR; INTRAVENOUS AS NEEDED
Status: DISCONTINUED | OUTPATIENT
Start: 2025-02-26 | End: 2025-02-26

## 2025-02-26 RX ORDER — DIPHENHYDRAMINE HYDROCHLORIDE 50 MG/ML
25 INJECTION INTRAMUSCULAR; INTRAVENOUS ONCE AS NEEDED
Status: DISCONTINUED | OUTPATIENT
Start: 2025-02-26 | End: 2025-02-26 | Stop reason: HOSPADM

## 2025-02-26 RX ORDER — BUPIVACAINE HYDROCHLORIDE 5 MG/ML
INJECTION, SOLUTION PERINEURAL AS NEEDED
Status: DISCONTINUED | OUTPATIENT
Start: 2025-02-26 | End: 2025-02-26 | Stop reason: HOSPADM

## 2025-02-26 RX ORDER — CELECOXIB 200 MG/1
400 CAPSULE ORAL ONCE
Status: COMPLETED | OUTPATIENT
Start: 2025-02-26 | End: 2025-02-26

## 2025-02-26 RX ORDER — SODIUM CHLORIDE, SODIUM LACTATE, POTASSIUM CHLORIDE, CALCIUM CHLORIDE 600; 310; 30; 20 MG/100ML; MG/100ML; MG/100ML; MG/100ML
INJECTION, SOLUTION INTRAVENOUS CONTINUOUS PRN
Status: DISCONTINUED | OUTPATIENT
Start: 2025-02-26 | End: 2025-02-26

## 2025-02-26 RX ORDER — PROPOFOL 10 MG/ML
INJECTION, EMULSION INTRAVENOUS AS NEEDED
Status: DISCONTINUED | OUTPATIENT
Start: 2025-02-26 | End: 2025-02-26

## 2025-02-26 RX ORDER — OXYCODONE HYDROCHLORIDE 5 MG/1
5 TABLET ORAL EVERY 8 HOURS PRN
Qty: 9 TABLET | Refills: 0 | Status: SHIPPED | OUTPATIENT
Start: 2025-02-26 | End: 2025-03-01

## 2025-02-26 RX ORDER — MIDAZOLAM HYDROCHLORIDE 1 MG/ML
INJECTION INTRAMUSCULAR; INTRAVENOUS AS NEEDED
Status: DISCONTINUED | OUTPATIENT
Start: 2025-02-26 | End: 2025-02-26

## 2025-02-26 RX ORDER — GABAPENTIN 300 MG/1
600 CAPSULE ORAL ONCE
Status: COMPLETED | OUTPATIENT
Start: 2025-02-26 | End: 2025-02-26

## 2025-02-26 RX ORDER — ROCURONIUM BROMIDE 10 MG/ML
INJECTION, SOLUTION INTRAVENOUS AS NEEDED
Status: DISCONTINUED | OUTPATIENT
Start: 2025-02-26 | End: 2025-02-26

## 2025-02-26 RX ORDER — LIDOCAINE HYDROCHLORIDE 10 MG/ML
INJECTION, SOLUTION INTRAVENOUS AS NEEDED
Status: DISCONTINUED | OUTPATIENT
Start: 2025-02-26 | End: 2025-02-26

## 2025-02-26 RX ORDER — ONDANSETRON 4 MG/1
4 TABLET, FILM COATED ORAL EVERY 6 HOURS PRN
Qty: 20 TABLET | Refills: 0 | Status: SHIPPED | OUTPATIENT
Start: 2025-02-26 | End: 2025-03-05

## 2025-02-26 RX ADMIN — PROPOFOL 200 MG: 10 INJECTION, EMULSION INTRAVENOUS at 13:04

## 2025-02-26 RX ADMIN — CELECOXIB 400 MG: 200 CAPSULE ORAL at 11:33

## 2025-02-26 RX ADMIN — SUGAMMADEX 200 MG: 100 INJECTION, SOLUTION INTRAVENOUS at 13:31

## 2025-02-26 RX ADMIN — HYDROMORPHONE HYDROCHLORIDE 0.5 MG: 1 INJECTION, SOLUTION INTRAMUSCULAR; INTRAVENOUS; SUBCUTANEOUS at 14:03

## 2025-02-26 RX ADMIN — LIDOCAINE HYDROCHLORIDE 60 MG: 10 INJECTION, SOLUTION INTRAVENOUS at 13:04

## 2025-02-26 RX ADMIN — MIDAZOLAM HYDROCHLORIDE 2 MG: 1 INJECTION, SOLUTION INTRAMUSCULAR; INTRAVENOUS at 13:00

## 2025-02-26 RX ADMIN — HYDROMORPHONE HYDROCHLORIDE 0.5 MG: 1 INJECTION, SOLUTION INTRAMUSCULAR; INTRAVENOUS; SUBCUTANEOUS at 14:25

## 2025-02-26 RX ADMIN — SCOLOPAMINE TRANSDERMAL SYSTEM 1 PATCH: 1 PATCH, EXTENDED RELEASE TRANSDERMAL at 12:38

## 2025-02-26 RX ADMIN — ONDANSETRON 4 MG: 2 INJECTION, SOLUTION INTRAMUSCULAR; INTRAVENOUS at 13:04

## 2025-02-26 RX ADMIN — FENTANYL CITRATE 25 MCG: 50 INJECTION, SOLUTION INTRAMUSCULAR; INTRAVENOUS at 13:21

## 2025-02-26 RX ADMIN — DEXAMETHASONE SODIUM PHOSPHATE 8 MG: 4 INJECTION, SOLUTION INTRAMUSCULAR; INTRAVENOUS at 13:04

## 2025-02-26 RX ADMIN — OXYCODONE HYDROCHLORIDE 5 MG: 5 TABLET ORAL at 15:01

## 2025-02-26 RX ADMIN — ROCURONIUM BROMIDE 40 MG: 10 INJECTION, SOLUTION INTRAVENOUS at 13:04

## 2025-02-26 RX ADMIN — APREPITANT 40 MG: 40 CAPSULE ORAL at 12:38

## 2025-02-26 RX ADMIN — GABAPENTIN 600 MG: 300 CAPSULE ORAL at 11:33

## 2025-02-26 RX ADMIN — SODIUM CHLORIDE, POTASSIUM CHLORIDE, SODIUM LACTATE AND CALCIUM CHLORIDE: 600; 310; 30; 20 INJECTION, SOLUTION INTRAVENOUS at 13:00

## 2025-02-26 RX ADMIN — FENTANYL CITRATE 25 MCG: 50 INJECTION, SOLUTION INTRAMUSCULAR; INTRAVENOUS at 13:08

## 2025-02-26 ASSESSMENT — PAIN SCALES - GENERAL
PAINLEVEL_OUTOF10: 4
PAINLEVEL_OUTOF10: 4
PAINLEVEL_OUTOF10: 5 - MODERATE PAIN
PAINLEVEL_OUTOF10: 4
PAINLEVEL_OUTOF10: 8
PAINLEVEL_OUTOF10: 7
PAINLEVEL_OUTOF10: 0 - NO PAIN
PAINLEVEL_OUTOF10: 6
PAINLEVEL_OUTOF10: 8
PAINLEVEL_OUTOF10: 4
PAINLEVEL_OUTOF10: 5 - MODERATE PAIN

## 2025-02-26 ASSESSMENT — PAIN - FUNCTIONAL ASSESSMENT
PAIN_FUNCTIONAL_ASSESSMENT: 0-10
PAIN_FUNCTIONAL_ASSESSMENT: UNABLE TO SELF-REPORT
PAIN_FUNCTIONAL_ASSESSMENT: 0-10

## 2025-02-26 ASSESSMENT — PAIN DESCRIPTION - LOCATION
LOCATION: ABDOMEN

## 2025-02-26 ASSESSMENT — COLUMBIA-SUICIDE SEVERITY RATING SCALE - C-SSRS
1. IN THE PAST MONTH, HAVE YOU WISHED YOU WERE DEAD OR WISHED YOU COULD GO TO SLEEP AND NOT WAKE UP?: NO
2. HAVE YOU ACTUALLY HAD ANY THOUGHTS OF KILLING YOURSELF?: NO
6. HAVE YOU EVER DONE ANYTHING, STARTED TO DO ANYTHING, OR PREPARED TO DO ANYTHING TO END YOUR LIFE?: NO

## 2025-02-26 NOTE — ANESTHESIA POSTPROCEDURE EVALUATION
Patient: Elmira Harrison    Procedure Summary       Date: 02/26/25 Room / Location: [facility] OR 08 / Virtual [facility] OR    Anesthesia Start: 1300 Anesthesia Stop: 1349    Procedure: Laparoscopic CO2 Lase Pelvic Cavity Lesion Excision (Pelvis) Diagnosis:       Pelvic pain in female      (Pelvic pain in female [R10.2])    Surgeons: Oliver Padgett DO Responsible Provider: Ortega Art MD    Anesthesia Type: general ASA Status: 2            Anesthesia Type: general    Vitals Value Taken Time   /70 02/26/25 1515   Temp 36.4 °C (97.5 °F) 02/26/25 1515   Pulse 86 02/26/25 1515   Resp 15 02/26/25 1515   SpO2 99 % 02/26/25 1515       Anesthesia Post Evaluation    Patient location during evaluation: PACU  Patient participation: complete - patient participated  Level of consciousness: awake and alert  Pain management: adequate  Airway patency: patent  Cardiovascular status: acceptable and hemodynamically stable  Respiratory status: acceptable, spontaneous ventilation and nonlabored ventilation  Hydration status: acceptable  Postoperative Nausea and Vomiting: none        There were no known notable events for this encounter.

## 2025-02-26 NOTE — OP NOTE
Laparoscopic CO2 Lase Pelvic Cavity Lesion Excision Operative Note     Date: 2025  OR Location: [facility] OR    Name: Elmira Harrison, : 1995, Age: 30 y.o., MRN: [id number], Sex: female    Diagnosis  Pre-op Diagnosis      * Pelvic pain in female [R10.2] Post-op Diagnosis     * Pelvic pain in female [R10.2]     Procedures  Laparoscopic CO2 Lase Pelvic Cavity Lesion Excision  [id number] - AR LAPS FULG/EXC OVARY VISCERA/PERITONEAL SURFACE      Surgeons      * Oliver Padgett - Primary    Resident/Fellow/Other Assistant:  Surgeons and Role:  * No surgeons found with a matching role *    Staff:   Circulator: Brandy Reese Person: Sydnee    Anesthesia Staff: Anesthesiologist: Ortega Art MD  CRNA: DAKOTAH Taylor-CRNA    Procedure Summary  Anesthesia: General  ASA: II  Estimated Blood Loss: 0 mL  Intra-op Medications:   Administrations occurring from 1230 to 1345 on 25:   Medication Name Total Dose   BUPivacaine HCl (Marcaine) 0.5 % (5 mg/mL) injection 10 mL   aprepitant (Emend) 40 mg capsule 40 mg   dexAMETHasone (Decadron) injection 4 mg/mL 8 mg   fentaNYL (Sublimaze) injection 50 mcg/mL 50 mcg   LR infusion Cannot be calculated   lidocaine PF (cardiac) syringe 1% 60 mg   midazolam PF (Versed) injection 1 mg/mL 2 mg   ondansetron (Zofran) 2 mg/mL injection 4 mg   propofol (Diprivan) injection 10 mg/mL 200 mg   rocuronium (ZeMuron) 50 mg/5 mL injection 40 mg   scopolamine patch 1 patch   sugammadex (Bridion) 200 mg/2 mL injection 200 mg              Anesthesia Record               Intraprocedure I/O Totals          Intake    LR infusion 500.00 mL    Total Intake 500 mL       Output    Urine 100 mL    Est. Blood Loss 2 mL    Total Output 102 mL       Net    Net Volume 398 mL          Specimen: No specimens collected              Drains and/or Catheters:   [REMOVED] Urethral Catheter Non-latex 16 Fr. (Removed)       Tourniquet Times:         Implants:     Findings: see dictation    Indications: Elmira  Christy is an 30 y.o. female who is having surgery for Pelvic pain in female [R10.2].     The patient was seen in the preoperative area. The risks, benefits, complications, treatment options, non-operative alternatives, expected recovery and outcomes were discussed with the patient. The possibilities of reaction to medication, pulmonary aspiration, injury to surrounding structures, bleeding, recurrent infection, the need for additional procedures, failure to diagnose a condition, and creating a complication requiring transfusion or operation were discussed with the patient. The patient concurred with the proposed plan, giving informed consent.  The site of surgery was properly noted/marked if necessary per policy. The patient has been actively warmed in preoperative area. Preoperative antibiotics are not indicated. Venous thrombosis prophylaxis have been ordered including bilateral sequential compression devices    Procedure Details: Patient in the operative suite she was prepped and draped normal sterile fashion the [place] was placed and a weighted speculum patient diagnosed with tingling was placed on antilipid the cervix and a uterine manipulator was placed without any difficulty.  Next a infraumbilical incision was made and the artery was closed with a positive saline drop test and 3 L of CO2 was placed in the pelvis.  The Veress was removed and a 12 mm trocar was placed the camera was inserted there was no direct damage to the underlying structures.  A panoramic view of the pelvis was done.  There was endometriosis on the fundus of the uterus and on the posterior aspect of the uterus.  There was endometriosis in the posterior cul-de-sac.  Next the laser was attached to the scope and at 5 W continuous all endometriosis that was seen was cauterized.  A second look was done and there was no damage to any structures and there was excellent hemostasis.  There was ovaries were normal as were the tubes.  There were  no adhesions to the tubes.  She had a stage I endometriosis.  The scope was removed and pneumoperitoneum was relieved.  The fascia was closed using 0 Vicryl on a UR 6 needle and the skin was closed using 4-0 Monocryl the skin was injected with half percent Marcaine sponge lap and needle counts correct x 2 the patient was taken the PACU in stable condition  Complications:  None; patient tolerated the procedure well.    Disposition: PACU - hemodynamically stable.  Condition: stable             Task Performed by KELLY First Assist or Physician Assistant:   The PA/NP, was necessary to assist on this case due to the nature of the case and difficulty. During the case she served as my assist by assisting with opening closing during the procedure      Additional Details: none    Attending Attestation: I performed the procedure.    Oliver Padgett  Phone Number: [phone]

## 2025-02-26 NOTE — ANESTHESIA PROCEDURE NOTES
Airway  Date/Time: 2/26/2025 1:07 PM  Urgency: elective    Airway not difficult    Staffing  Performed: CRNA   Authorized by: Ortega Art MD    Performed by: DAKOTAH Taylor-PAULA  Patient location during procedure: OR    Indications and Patient Condition  Indications for airway management: anesthesia  Spontaneous Ventilation: absent  Sedation level: deep  Preoxygenated: yes  Patient position: sniffing  MILS maintained throughout  Mask difficulty assessment: 1 - vent by mask    Final Airway Details  Final airway type: endotracheal airway      Successful airway: ETT  Cuffed: yes   Successful intubation technique: direct laryngoscopy  Blade: Prasanna  Blade size: #3  ETT size (mm): 7.0  Cormack-Lehane Classification: grade I - full view of glottis  Placement verified by: chest auscultation and capnometry   Measured from: lips  ETT to lips (cm): 21  Number of attempts at approach: 1

## 2025-02-26 NOTE — NURSING NOTE
1516: Handoff received from Becki ESPINOZA at this time, assumed care for patient at this time    1520: Family at bedside    1525: Patient dressed with family assistance.     1530: Discharge instructions reviewed with patient and family, no further questions at this time. Printed after visit summary with written instruction provided.    1535: Peripheral IV removed with no complications.     1551: Patient to main lobby via transport with all belongings in stable condition. Phase 2 complete.

## 2025-02-26 NOTE — ANESTHESIA PREPROCEDURE EVALUATION
Patient: Elmira Harrison    Procedure Information       Date/Time: 02/26/25 1230    Procedure: Laparoscopic CO2 Lase Pelvic Cavity Lesion Excision (Pelvis)    Location: [facility] OR 08 / Virtual [facility] OR    Surgeons: Oliver Padgett, DO            Relevant Problems   Neuro   (+) Anxiety   (+) Chronic migraine with aura without status migrainosus, not intractable      GI   (+) IBS (irritable bowel syndrome)      GYN   (+) PCOS (polycystic ovarian syndrome)       Clinical information reviewed:   Tobacco   Meds   Med Hx  Surg Hx  OB Status  Fam Hx  Soc Hx         Past Medical History:   Diagnosis Date    ADHD     Allergic purpura (CMS-HCC) 12/11/2013    Henoch-Schonlein purpura    Anemia     Endometriosis     Fibroid     GERD (gastroesophageal reflux disease)     History of pulmonary embolus (PE)     ?    Inflammatory myopathy     Intestinal malrotation (Multi)     Migraine     PCOS (polycystic ovarian syndrome)     Retroesophageal right subclavian artery       Past Surgical History:   Procedure Laterality Date    APPENDECTOMY  06/18/2021    CHOLECYSTECTOMY  05/22/2016    LAPAROSCOPY DIAGNOSTIC / BIOPSY / ASPIRATION / LYSIS      Exploratory Laparoscopy x3     Social History     Tobacco Use    Smoking status: Former     Types: Cigarettes    Smokeless tobacco: Never   Vaping Use    Vaping status: Every Day    Substances: Nicotine    Devices: Disposable   Substance Use Topics    Alcohol use: Not Currently    Drug use: Never      Current Outpatient Medications   Medication Instructions    ALPRAZolam (XANAX) 0.25 mg, oral, 2 times daily PRN    clobetasol (Temovate) 0.05 % external solution Apply topically.    escitalopram (LEXAPRO) 20 mg, oral, Daily    megestrol (MEGACE) 20 mg, As needed    pantoprazole (PROTONIX) 40 mg, oral, Daily, Do not crush, chew, or split.      Allergies   Allergen Reactions    Penicillins Rash, Unknown and Nausea/vomiting     Other reaction(s): Unknown    Valium [Diazepam] Hives and Palpitations  "   Acetaminophen Itching    Metoprolol Hives        Chemistry    Lab Results   Component Value Date/Time     09/23/2024 1158    K 4.0 09/23/2024 1158     09/23/2024 1158    CO2 24 09/23/2024 1158    BUN 7 09/23/2024 1158    CREATININE 0.66 09/23/2024 1158    Lab Results   Component Value Date/Time    CALCIUM 9.6 09/23/2024 1158    ALKPHOS 32 (L) 09/23/2024 1158    AST 12 09/23/2024 1158    ALT 9 09/23/2024 1158    BILITOT 0.6 09/23/2024 1158          No results found for: \"HGBA1C\"  Lab Results   Component Value Date/Time    WBC 7.4 02/25/2025 1309    HGB 13.2 02/25/2025 1309    HCT 40.4 02/25/2025 1309     02/25/2025 1309     No results found for: \"PROTIME\", \"PTT\", \"INR\"  No results found for: \"ABORH\"  Encounter Date: 02/14/25   ECG 12 Lead   Result Value    Ventricular Rate 76    Atrial Rate 76    TN Interval 136    QRS Duration 78    QT Interval 384    QTC Calculation(Bazett) 432    P Axis 51    R Axis 87    T Axis 44    QRS Count 13    Q Onset 221    P Onset 153    P Offset 195    T Offset 413    QTC Fredericia 415    Narrative    Normal sinus rhythm with sinus arrhythmia  Normal ECG  When compared with ECG of 04-DEC-2024 12:47,  Premature atrial complexes are no longer Present     No results found for this or any previous visit from the past 1095 days.    Echo 12/30/2024:   Left Ventricle: The left ventricular systolic function is normal, with a Siu's biplane calculated ejection fraction of 58%. There are no regional wall motion abnormalities. The left ventricular cavity size is normal. There is normal posterior left ventricular wall thickness. Left Ventricular Global Longitudinal Strain - 19.7 %. Spectral Doppler shows a normal pattern of left ventricular diastolic filling.  Left Atrium: The left atrium is normal in size.  Right Ventricle: The right ventricle is normal in size. There is normal right ventricular global systolic function.  Right Atrium: The right atrium is normal in " size.  Aortic Valve: The aortic valve is trileaflet. There is no evidence of aortic valve stenosis.  There is no evidence of aortic valve regurgitation.  Mitral Valve: The mitral valve is mildly thickened. There is trace mitral valve regurgitation.  Tricuspid Valve: The tricuspid valve is structurally normal. There is mild tricuspid regurgitation.  Pulmonic Valve: The pulmonic valve is structurally normal. There is trace to mild pulmonic valve regurgitation.  Pericardium: No pericardial effusion noted.  Aorta: The aortic root is normal.  Systemic Veins: The inferior vena cava appears normal in size.     CONCLUSIONS:   1. The left ventricular systolic function is normal, with a Siu's biplane calculated ejection fraction of 58%.   2. There is normal right ventricular global systolic function.   3. Aortic valve stenosis is not present.   4. Left Ventricular Global Longitudinal Strain - 19.7 %.    Visit Vitals  /66   Pulse 64   Temp 36.4 °C (97.5 °F) (Temporal)   Resp 15   Ht 1.524 m (5')   Wt 54.7 kg (120 lb 9.5 oz)   LMP 01/07/2025 (Exact Date)   SpO2 100%   BMI 23.55 kg/m²   OB Status Having periods   Smoking Status Former   BSA 1.52 m²     NPO/Void Status  Carbohydrate Drink Given Prior to Surgery? : N  Date of Last Liquid: 02/26/25  Time of Last Liquid: 0530  Date of Last Solid: 02/25/25  Time of Last Solid: 1830  Last Intake Type: Clear fluids  Time of Last Void: 1125        Physical Exam    Airway  Mallampati: II  TM distance: >3 FB  Neck ROM: full     Cardiovascular - normal exam  Rhythm: regular  Rate: normal     Dental    Pulmonary - normal exam     Abdominal - normal exam             Anesthesia Plan    History of general anesthesia?: yes  History of complications of general anesthesia?: no    ASA 2     general   (GETA with standard ASA monitoring)  intravenous induction   Postoperative administration of opioids is intended.  Anesthetic plan and risks discussed with patient.    Plan discussed with  REX and CRNA.

## 2025-02-28 ENCOUNTER — TELEPHONE (OUTPATIENT)
Facility: CLINIC | Age: 30
End: 2025-02-28
Payer: COMMERCIAL

## 2025-02-28 NOTE — TELEPHONE ENCOUNTER
Pt never received her [facility] paperwork back. Her surgery was two days ago. She brought the paperwork in on the 12th and was told it would be completed asap. She is in jeopardy of losing her job right now. Please advise.     Ester Zuniga MA

## 2025-03-12 ENCOUNTER — APPOINTMENT (OUTPATIENT)
Dept: OBSTETRICS AND GYNECOLOGY | Facility: CLINIC | Age: 30
End: 2025-03-12
Payer: COMMERCIAL

## 2025-03-12 VITALS
BODY MASS INDEX: 23.71 KG/M2 | SYSTOLIC BLOOD PRESSURE: 126 MMHG | WEIGHT: 120.8 LBS | DIASTOLIC BLOOD PRESSURE: 82 MMHG | HEIGHT: 60 IN

## 2025-03-12 DIAGNOSIS — E28.2 PCOS (POLYCYSTIC OVARIAN SYNDROME): Primary | ICD-10-CM

## 2025-03-12 DIAGNOSIS — N80.9 ENDOMETRIOSIS: ICD-10-CM

## 2025-03-12 PROCEDURE — 3008F BODY MASS INDEX DOCD: CPT | Performed by: OBSTETRICS & GYNECOLOGY

## 2025-03-12 PROCEDURE — 1036F TOBACCO NON-USER: CPT | Performed by: OBSTETRICS & GYNECOLOGY

## 2025-03-12 PROCEDURE — 99024 POSTOP FOLLOW-UP VISIT: CPT | Performed by: OBSTETRICS & GYNECOLOGY

## 2025-03-12 RX ORDER — METFORMIN HYDROCHLORIDE EXTENDED-RELEASE TABLETS 500 MG/1
500 TABLET, FILM COATED, EXTENDED RELEASE ORAL
Qty: 90 TABLET | Refills: 3 | Status: SHIPPED | OUTPATIENT
Start: 2025-03-12 | End: 2026-03-12

## 2025-03-12 ASSESSMENT — PATIENT HEALTH QUESTIONNAIRE - PHQ9
2. FEELING DOWN, DEPRESSED OR HOPELESS: NOT AT ALL
1. LITTLE INTEREST OR PLEASURE IN DOING THINGS: NOT AT ALL
SUM OF ALL RESPONSES TO PHQ9 QUESTIONS 1 AND 2: 0

## 2025-03-12 ASSESSMENT — COLUMBIA-SUICIDE SEVERITY RATING SCALE - C-SSRS
1. IN THE PAST MONTH, HAVE YOU WISHED YOU WERE DEAD OR WISHED YOU COULD GO TO SLEEP AND NOT WAKE UP?: NO
6. HAVE YOU EVER DONE ANYTHING, STARTED TO DO ANYTHING, OR PREPARED TO DO ANYTHING TO END YOUR LIFE?: NO
2. HAVE YOU ACTUALLY HAD ANY THOUGHTS OF KILLING YOURSELF?: NO

## 2025-03-12 ASSESSMENT — ENCOUNTER SYMPTOMS
DEPRESSION: 0
LOSS OF SENSATION IN FEET: 0
OCCASIONAL FEELINGS OF UNSTEADINESS: 0

## 2025-03-12 NOTE — PROGRESS NOTES
Elmira Harrison is a 30 y.o. female who presents with a chief complaint of Post-op      SUBJECTIVE  Patient presents postop CO2 laser laparoscopy for endometriosis.  She is doing well denies any problems.  Her incisions clean and dry without erythema or drainage.  She is happy with her surgery.  She also would like to be started on metformin for her PCOS.    Past Medical History:   Diagnosis Date    ADHD     Allergic purpura (CMS-HCC) 2013    Henoch-Schonlein purpura    Anemia     Endometriosis     Fibroid     GERD (gastroesophageal reflux disease)     Inflammatory myopathy     Intestinal malrotation (Multi)     Migraine     PCOS (polycystic ovarian syndrome)     PONV (postoperative nausea and vomiting)     Retroesophageal right subclavian artery      Past Surgical History:   Procedure Laterality Date    APPENDECTOMY  2021    CHOLECYSTECTOMY  2016    LAPAROSCOPY DIAGNOSTIC / BIOPSY / ASPIRATION / LYSIS      Exploratory Laparoscopy x3     Social History     Socioeconomic History    Marital status: Single   Tobacco Use    Smoking status: Former     Types: Cigarettes    Smokeless tobacco: Never   Vaping Use    Vaping status: Every Day    Substances: Nicotine    Devices: Disposable   Substance and Sexual Activity    Alcohol use: Not Currently    Drug use: Never    Sexual activity: Yes     Partners: Male     Birth control/protection: None     No family history on file.    OB History    Para Term  AB Living   4 1 1 0 3 1   SAB IAB Ectopic Multiple Live Births   1 1 1 0 1      # Outcome Date GA Lbr Wesley/2nd Weight Sex Type Anes PTL Lv   4 SAB 2024           3 Ectopic 2019           2 Term 2017    M Vag-Spont   MELONIE   1 IAB                OBJECTIVE  Allergies   Allergen Reactions    Penicillins Rash, Unknown and Nausea/vomiting     Other reaction(s): Unknown    Valium [Diazepam] Hives and Palpitations    Acetaminophen Itching    Metoprolol Hives      (Not in a hospital admission)        Review of Systems  History obtained from the patient  General ROS: negative  Psychological ROS: negative  Gastrointestinal ROS: no abdominal pain, change in bowel habits, or black or bloody stools  Musculoskeletal ROS: negative  Physical Exam  General Appearance: awake, alert, oriented, in no acute distress, well developed, well nourished, and in no acute distress  Skin: there are no suspicious lesions or rashes of concern, skin color, texture, turgor are normal; there are no bruises, rashes or lesions.  Head/Face: NCAT  Eyes: No gross abnormalities., PERRL, and EOMI  Neck: neck- supple, no mass, non-tender  Back: no pain to palpation  Abdomen: Soft, non-tender, normal bowel sounds; no bruits, organomegaly or masses.  Extremities: Extremities warm to touch, pink, with no edema.  Musculoskeletal: negative    /82   Ht 1.524 m (5')   Wt 54.8 kg (120 lb 12.8 oz)   LMP 03/09/2025 (Exact Date)   BMI 23.59 kg/m²    Problem List Items Addressed This Visit       PCOS (polycystic ovarian syndrome) - Primary    Relevant Medications    metFORMIN, OSM, (Fortamet) 500 mg 24 hr tablet     Other Visit Diagnoses       Endometriosis               Follow up for annual

## 2025-03-27 ENCOUNTER — OFFICE VISIT (OUTPATIENT)
Dept: CARDIOLOGY | Facility: HOSPITAL | Age: 30
End: 2025-03-27
Payer: COMMERCIAL

## 2025-03-27 VITALS
DIASTOLIC BLOOD PRESSURE: 64 MMHG | SYSTOLIC BLOOD PRESSURE: 100 MMHG | OXYGEN SATURATION: 99 % | WEIGHT: 120 LBS | BODY MASS INDEX: 23.56 KG/M2 | HEIGHT: 60 IN | HEART RATE: 72 BPM

## 2025-03-27 DIAGNOSIS — R00.2 PALPITATIONS: Primary | ICD-10-CM

## 2025-03-27 DIAGNOSIS — I49.1 PAC (PREMATURE ATRIAL CONTRACTION): ICD-10-CM

## 2025-03-27 PROCEDURE — 99213 OFFICE O/P EST LOW 20 MIN: CPT | Performed by: STUDENT IN AN ORGANIZED HEALTH CARE EDUCATION/TRAINING PROGRAM

## 2025-03-27 PROCEDURE — 3008F BODY MASS INDEX DOCD: CPT | Performed by: STUDENT IN AN ORGANIZED HEALTH CARE EDUCATION/TRAINING PROGRAM

## 2025-03-27 PROCEDURE — 1036F TOBACCO NON-USER: CPT | Performed by: STUDENT IN AN ORGANIZED HEALTH CARE EDUCATION/TRAINING PROGRAM

## 2025-03-27 RX ORDER — METOPROLOL SUCCINATE 25 MG/1
12.5 TABLET, EXTENDED RELEASE ORAL DAILY
Qty: 15 TABLET | Refills: 11 | Status: SHIPPED | OUTPATIENT
Start: 2025-03-27 | End: 2026-03-22

## 2025-03-27 NOTE — PROGRESS NOTES
[facility] Clinic Note    Date: 03/27/25  Time: 4:32 PM    Subjective   Elmira Harrison is a 29 y.o. female who is referred to me for palpitation. Patient reports since patient had COVID in 2021 and has noted palpitation and lightheadedness.  She had tilt table testing and holter and reports there was POTS?dysautonomia but I have no records to review. Patient reports she had miscarriage last year and since then she has been having more palpitation and lightheadedness. She reports getting them when have menstrual cycle and also stress/surgery. She has TTE which didn't show any major structural diz, Holter showed frequent PAC. she reports symptoms are improved but not resolved.  Her thyroid profile was unremarkable.      We also did tilt table which was negative for POTS or dysautonomia.     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   Fluid intake-40 ounce in AM and added salt to food        Review of Systems:  Otherwise, limited cardiovascular review of systems is negative.        Medical History:   She has a past medical history of ADHD, Allergic purpura (CMS-HCC) (12/11/2013), Anemia, Endometriosis, Fibroid, GERD (gastroesophageal reflux disease), Inflammatory myopathy, Intestinal malrotation (Multi), Migraine, PCOS (polycystic ovarian syndrome), PONV (postoperative nausea and vomiting), and Retroesophageal right subclavian artery.  Surgical History:   Past Surgical History:   Procedure Laterality Date    APPENDECTOMY  06/18/2021    CHOLECYSTECTOMY  05/22/2016    LAPAROSCOPY DIAGNOSTIC / BIOPSY / ASPIRATION / LYSIS      Exploratory Laparoscopy x3   PSHP@  Social History:   Social Drivers of Health with Concerns     Tobacco Use: Medium Risk (3/27/2025)     Patient History     Smoking Tobacco Use: Former     Smokeless Tobacco Use: Never     Passive Exposure: Not on file   Alcohol Use: Not on file   Financial Resource Strain: Not on file   Food Insecurity: Not on file   Transportation Needs: Not on file   Physical Activity: Not on file   Stress: Not on file   Social Connections: Not on file   Intimate Partner Violence: Not on file   Depression: Not at risk (3/12/2025)    PHQ-2     PHQ-2 Score: 0   Recent Concern: Depression - At risk (1/10/2025)    PHQ-2     PHQ-2 Score: 3   Housing Stability: Not on file   Utilities: Not on file   Digital Equity: Not on file   Health Literacy: Not on file     Family History:   No family history on file.   Allergies:  Penicillins, Valium [diazepam], Acetaminophen, and Metoprolol    Outpatient Medications:  Current Outpatient Medications   Medication Instructions    ALPRAZolam (XANAX) 0.25 mg, oral, 2 times daily PRN    clobetasol (Temovate) 0.05 % external solution Apply topically.    megestrol (MEGACE) 20 mg, As needed    metFORMIN (OSM) (FORTAMET) 500 mg, oral, Daily with evening meal, Do not crush, chew, or split.    metoprolol succinate XL (TOPROL-XL) 12.5 mg, oral, Daily, Do not crush or chew.    pantoprazole (PROTONIX) 40 mg, oral, Daily, Do not crush, chew, or split.       Objective     Physical Exam  Vitals:    03/27/25 1533   BP: 100/64   BP Location: Left arm   Patient Position: Sitting   BP Cuff Size: Adult   Pulse: 72   SpO2: 99%   Weight: 54.4 kg (120 lb)   Height: 1.524 m (5')     Wt Readings from Last 3 Encounters:   03/27/25 54.4 kg (120 lb)   03/12/25 54.8 kg (120 lb 12.8 oz)   02/26/25 54.7 kg (120 lb 9.5 oz)         General: Alert and Oriented, No distress, cooperative  Head: Normocephalic without obvious abnormality, atraumatic  Eyes: Conjunctiva/corneas clear, EOM's grossly intact  Neck: Supple, trachea midline, No thyroid enlargement/tenderness/nodules; No JVD  Lungs: Clear to auscultation bilaterally, no wheezes,  rhonci, or rales. respirations unlabored  Chest Wall: No tenderness or deformity  Heart: Regular rhythm, normal S1/S2, no murmur  Abdomen: Soft, non-tender, Non-distended, bowel sounds active  Extremities: No edema, no cyanosis, no clubbing  Skin: Skin color, texture, turgor normal.  No rashes or lesions noted  Neurologic: Alert and oriented x 3, grossly moving all extremities, speech intact       I have personally reviewed the following images and laboratory findings:  ECG: not done today  Echocardiogram:   TRANSTHORACIC ECHOCARDIOGRAM REPORT     Patient Name:       ZOHRA TRACY    Reading Physician:    [id number] Nathan Mcclelland DO  Study Date:         12/30/2024          Ordering Provider:    [id number] RAJINDER ANNE  MRN/PID:            [id number]            Fellow:  Accession#:         [id number]        Nurse:  Date of Birth/Age:  1995 / 29      Sonographer:          Jael Foy RDCS                      years  Gender Assigned at  F                   Additional Staff:  Birth:  Height:             152.40 cm           Admit Date:  Weight:             54.43 kg            Admission Status:     Outpatient  BSA / BMI:          1.50 m2 / 23.44     Department Location:  [facility] Echo                      kg/m2                                     Lab  Blood Pressure: 98 /70 mmHg     Study Type:    TRANSTHORACIC ECHO (TTE) COMPLETE  Diagnosis/ICD: Palpitations-R00.2; Dizziness and giddiness-R42  Indication:    Palpitations, Dizziness  CPT Codes:     Echo Complete w Full Doppler-37967; Myocardial Strain                 Imaging-[id number]     Patient History:  Pertinent History: Palpitations.     Study Detail: The following Echo studies were performed: 2D, M-Mode, Doppler,                color flow and Strain.        PHYSICIAN INTERPRETATION:  Left Ventricle: The left ventricular systolic function is  normal, with a Siu's biplane calculated ejection fraction of 58%. There are no regional wall motion abnormalities. The left ventricular cavity size is normal. There is normal posterior left ventricular wall thickness. Left Ventricular Global Longitudinal Strain - 19.7 %. Spectral Doppler shows a normal pattern of left ventricular diastolic filling.  Left Atrium: The left atrium is normal in size.  Right Ventricle: The right ventricle is normal in size. There is normal right ventricular global systolic function.  Right Atrium: The right atrium is normal in size.  Aortic Valve: The aortic valve is trileaflet. There is no evidence of aortic valve stenosis.  There is no evidence of aortic valve regurgitation.  Mitral Valve: The mitral valve is mildly thickened. There is trace mitral valve regurgitation.  Tricuspid Valve: The tricuspid valve is structurally normal. There is mild tricuspid regurgitation.  Pulmonic Valve: The pulmonic valve is structurally normal. There is trace to mild pulmonic valve regurgitation.  Pericardium: No pericardial effusion noted.  Aorta: The aortic root is normal.  Systemic Veins: The inferior vena cava appears normal in size.        CONCLUSIONS:   1. The left ventricular systolic function is normal, with a Siu's biplane calculated ejection fraction of 58%.   2. There is normal right ventricular global systolic function.   3. Aortic valve stenosis is not present.   4. Left Ventricular Global Longitudinal Strain - 19.7 %.               Laboratory values:   Admission on 02/26/2025, Discharged on 02/26/2025   Component Date Value    Preg Test, Ur 02/26/2025 Negative    Orders Only on 02/25/2025   Component Date Value    WHITE BLOOD CELL COUNT 02/25/2025 7.4     RED BLOOD CELL COUNT 02/25/2025 4.53     HEMOGLOBIN 02/25/2025 13.2     HEMATOCRIT 02/25/2025 40.4     MCV 02/25/2025 89.2     MCH 02/25/2025 29.1     MCHC 02/25/2025 32.7     RDW 02/25/2025 12.0     PLATELET COUNT 02/25/2025 319   "   MPV 02/25/2025 9.1     ABSOLUTE NEUTROPHILS 02/25/2025 4,758     ABSOLUTE LYMPHOCYTES 02/25/2025 2,183     ABSOLUTE MONOCYTES 02/25/2025 370     ABSOLUTE EOSINOPHILS 02/25/2025 37     ABSOLUTE BASOPHILS 02/25/2025 52     NEUTROPHILS 02/25/2025 64.3     LYMPHOCYTES 02/25/2025 29.5     MONOCYTES 02/25/2025 5.0     EOSINOPHILS 02/25/2025 0.5     BASOPHILS 02/25/2025 0.7     COMMENT 02/25/2025      HCG, QL, URINE 02/25/2025 NEGATIVE     ABO GROUP 02/25/2025 AB     RH TYPE 02/25/2025 RH(D) POSITIVE    Office Visit on 02/14/2025   Component Date Value    Ventricular Rate 02/14/2025 76     Atrial Rate 02/14/2025 76     NJ Interval 02/14/2025 136     QRS Duration 02/14/2025 78     QT Interval 02/14/2025 384     QTC Calculation(Bazett) 02/14/2025 432     P Axis 02/14/2025 51     R Axis 02/14/2025 87     T [place] 02/14/2025 44     QRS Count 02/14/2025 13     Q Onset 02/14/2025 221     P Onset 02/14/2025 153     P Offset 02/14/2025 195     T Offset 02/14/2025 413     QTC Frederic[place] 02/14/2025 415      CBC -  Lab Results   Component Value Date    WBC 7.4 02/25/2025    HGB 13.2 02/25/2025    HCT 40.4 02/25/2025    MCV 89.2 02/25/2025     02/25/2025       CMP -  Lab Results   Component Value Date    CALCIUM 9.6 09/23/2024    PROT 7.6 09/23/2024    ALBUMIN 5.1 (H) 09/23/2024    AST 12 09/23/2024    ALT 9 09/23/2024    ALKPHOS 32 (L) 09/23/2024    BILITOT 0.6 09/23/2024       LIPID PANEL -   Lab Results   Component Value Date    CHOL 185 01/14/2023    HDL 48.0 01/14/2023    CHHDL 3.9 01/14/2023    VLDL 15 01/14/2023    TRIG 76 01/14/2023       RENAL FUNCTION PANEL -   Lab Results   Component Value Date    K 4.0 09/23/2024       No results found for: \"BNP\", \"HGBA1C\"     Assessment/Plan   Palpitation  Dizziness.   PAC/SVE     Plan:  -I reviewed results of echocardiogram, tilt table, and Holter monitor with the patient.  Patient with frequent SVE/PAC which may be contributing to symptoms of palpitation.  -We discussed about " beta-blocker therapy.  Patient reports there is reported history of hives but she is willing to try metoprolol if that does not work we will consider propranolol.  She is unsure if she had true reaction to metoprolol.  We will start on metoprolol succinate 12.5 mg daily.  Encourage patient to remain hydrated and drink at least 64 ounces of fluid daily.  RTC in 6 months  In addition, the following orders were placed today:  No orders of the defined types were placed in this encounter.                SIGNATURE: Roly Bejarano MD PATIENT NAME: Elmira Harrison   DATE/TIME: March 27, 2025 4:32 PM MRN: [id number]

## 2025-04-08 ENCOUNTER — PATIENT MESSAGE (OUTPATIENT)
Dept: PRIMARY CARE | Facility: CLINIC | Age: 30
End: 2025-04-08

## 2025-04-08 ENCOUNTER — APPOINTMENT (OUTPATIENT)
Dept: PRIMARY CARE | Facility: CLINIC | Age: 30
End: 2025-04-08
Payer: COMMERCIAL

## 2025-04-08 VITALS
DIASTOLIC BLOOD PRESSURE: 80 MMHG | SYSTOLIC BLOOD PRESSURE: 116 MMHG | WEIGHT: 123 LBS | BODY MASS INDEX: 24.15 KG/M2 | HEART RATE: 78 BPM | OXYGEN SATURATION: 99 % | HEIGHT: 60 IN

## 2025-04-08 DIAGNOSIS — K58.1 IRRITABLE BOWEL SYNDROME WITH CONSTIPATION: Primary | ICD-10-CM

## 2025-04-08 DIAGNOSIS — F41.9 ANXIETY: ICD-10-CM

## 2025-04-08 DIAGNOSIS — K58.1 IRRITABLE BOWEL SYNDROME WITH CONSTIPATION: ICD-10-CM

## 2025-04-08 DIAGNOSIS — F51.01 PRIMARY INSOMNIA: Primary | ICD-10-CM

## 2025-04-08 PROCEDURE — 3008F BODY MASS INDEX DOCD: CPT | Performed by: FAMILY MEDICINE

## 2025-04-08 PROCEDURE — 99214 OFFICE O/P EST MOD 30 MIN: CPT | Performed by: FAMILY MEDICINE

## 2025-04-08 RX ORDER — ESCITALOPRAM OXALATE 20 MG/1
20 TABLET ORAL 2 TIMES DAILY
Qty: 180 TABLET | Refills: 0 | Status: SHIPPED | OUTPATIENT
Start: 2025-04-08

## 2025-04-08 RX ORDER — ALPRAZOLAM 0.25 MG/1
0.25 TABLET ORAL 2 TIMES DAILY PRN
Qty: 30 TABLET | Refills: 0 | Status: SHIPPED | OUTPATIENT
Start: 2025-04-08

## 2025-04-08 RX ORDER — CALCIUM CARBONATE 260MG(650)
1 TABLET,CHEWABLE ORAL DAILY
COMMUNITY

## 2025-04-08 RX ORDER — SYRING-NEEDL,DISP,INSUL,0.3 ML 29 G X1/2"
296 SYRINGE, EMPTY DISPOSABLE MISCELLANEOUS ONCE
COMMUNITY
End: 2025-04-08 | Stop reason: ENTERED-IN-ERROR

## 2025-04-08 RX ORDER — MIRTAZAPINE 7.5 MG/1
7.5 TABLET, FILM COATED ORAL NIGHTLY
Qty: 30 TABLET | Refills: 0 | Status: SHIPPED | OUTPATIENT
Start: 2025-04-08 | End: 2025-07-07

## 2025-04-08 RX ORDER — DULOXETIN HYDROCHLORIDE 30 MG/1
30 CAPSULE, DELAYED RELEASE ORAL DAILY
Qty: 30 CAPSULE | Refills: 1 | Status: SHIPPED | OUTPATIENT
Start: 2025-04-08 | End: 2025-04-11 | Stop reason: SDUPTHER

## 2025-04-08 ASSESSMENT — PATIENT HEALTH QUESTIONNAIRE - PHQ9
8. MOVING OR SPEAKING SO SLOWLY THAT OTHER PEOPLE COULD HAVE NOTICED. OR THE OPPOSITE, BEING SO FIGETY OR RESTLESS THAT YOU HAVE BEEN MOVING AROUND A LOT MORE THAN USUAL: MORE THAN HALF THE DAYS
9. THOUGHTS THAT YOU WOULD BE BETTER OFF DEAD, OR OF HURTING YOURSELF: NOT AT ALL
6. FEELING BAD ABOUT YOURSELF - OR THAT YOU ARE A FAILURE OR HAVE LET YOURSELF OR YOUR FAMILY DOWN: NOT AT ALL
7. TROUBLE CONCENTRATING ON THINGS, SUCH AS READING THE NEWSPAPER OR WATCHING TELEVISION: MORE THAN HALF THE DAYS
3. TROUBLE FALLING OR STAYING ASLEEP OR SLEEPING TOO MUCH: NEARLY EVERY DAY
SUM OF ALL RESPONSES TO PHQ QUESTIONS 1-9: 13
1. LITTLE INTEREST OR PLEASURE IN DOING THINGS: SEVERAL DAYS
4. FEELING TIRED OR HAVING LITTLE ENERGY: MORE THAN HALF THE DAYS
2. FEELING DOWN, DEPRESSED OR HOPELESS: SEVERAL DAYS
SUM OF ALL RESPONSES TO PHQ9 QUESTIONS 1 AND 2: 2
5. POOR APPETITE OR OVEREATING: MORE THAN HALF THE DAYS
10. IF YOU CHECKED OFF ANY PROBLEMS, HOW DIFFICULT HAVE THESE PROBLEMS MADE IT FOR YOU TO DO YOUR WORK, TAKE CARE OF THINGS AT HOME, OR GET ALONG WITH OTHER PEOPLE: SOMEWHAT DIFFICULT

## 2025-04-08 ASSESSMENT — ANXIETY QUESTIONNAIRES
4. TROUBLE RELAXING: MORE THAN HALF THE DAYS
5. BEING SO RESTLESS THAT IT IS HARD TO SIT STILL: MORE THAN HALF THE DAYS
1. FEELING NERVOUS, ANXIOUS, OR ON EDGE: SEVERAL DAYS
3. WORRYING TOO MUCH ABOUT DIFFERENT THINGS: MORE THAN HALF THE DAYS
2. NOT BEING ABLE TO STOP OR CONTROL WORRYING: SEVERAL DAYS
IF YOU CHECKED OFF ANY PROBLEMS ON THIS QUESTIONNAIRE, HOW DIFFICULT HAVE THESE PROBLEMS MADE IT FOR YOU TO DO YOUR WORK, TAKE CARE OF THINGS AT HOME, OR GET ALONG WITH OTHER PEOPLE: SOMEWHAT DIFFICULT
GAD7 TOTAL SCORE: 11
7. FEELING AFRAID AS IF SOMETHING AWFUL MIGHT HAPPEN: SEVERAL DAYS
6. BECOMING EASILY ANNOYED OR IRRITABLE: MORE THAN HALF THE DAYS

## 2025-04-08 NOTE — ASSESSMENT & PLAN NOTE
Transitioning to Cymbalta - alternate Escitalopram and Duloxetine for 1-2 weeks and then switch to Duloxetine alone.  Call with update in 1-2 months, sooner with any problems or concerns  Orders:    ALPRAZolam (Xanax) 0.25 mg tablet; Take 1 tablet (0.25 mg) by mouth 2 times a day as needed for anxiety.    escitalopram (Lexapro) 20 mg tablet; Take 1 tablet (20 mg) by mouth 2 times a day.    DULoxetine (Cymbalta) 30 mg DR capsule; Take 1 capsule (30 mg) by mouth once daily. Do not crush or chew.

## 2025-04-08 NOTE — ASSESSMENT & PLAN NOTE
Restarting Linzess - will refer to Clinical pharamcy if needed for assistance in coverage.  Orders:    linaCLOtide (Linzess) 145 mcg capsule; Take 1 capsule (145 mcg) by mouth once daily in the morning. Take before meals. Do not crush or chew.

## 2025-04-08 NOTE — PROGRESS NOTES
Subjective   Patient ID: Elmira Harrison is a 30 y.o. female who presents for Follow-up.    HPI    INSOMNIA: chronically an issue for her  Tried Trazodone but made her too congestion and not helpful with sleep  Tried Hydroxyzine but was too drowsy the next morning.  Not sleepign well  again - intermittent panic attacks when she lie sdown to go to bed.  Currently taking magnesium Citrate for constipation (IBS-C) but not helping with sleep    ANXIETY: slightly worse lately  She has not had recent appointment with psychiatry    Also noting some achiness and joint pans   Hips, elbows and wrists  No changes in activity    OARRS:  Jael Mccartney DO on 4/8/2025  9:14 AM  I have personally reviewed the OARRS report for Elmira Harrison. I have considered the risks of abuse, dependence, addiction and diversion    Is the patient prescribed a combination of a benzodiazepine and opioid?  No    Last Urine Drug Screen / ordered today: UTD as below  Recent Results (from the past 8760 hours)   Benzodiazepine Confirmation, Urine    Collection Time: 01/10/25  4:04 PM   Result Value Ref Range    Clonazepam <25 <25 ng/mL    7-Aminoclonazepam <25 <25 ng/mL    Alprazolam <25 <25 ng/mL    Alpha-Hydroxyalprazolam 48 (H) <25 ng/mL    Midazolam <25 <25 ng/mL    Alpha-Hydroxymidazolam <25 <25 ng/mL    Chlordiazepoxide <25 <25 ng/mL    Diazepam <25 <25 ng/mL    Nordiazepam <25 <25 ng/mL    Temazepam <25 <25 ng/mL    Oxazepam <25 <25 ng/mL    Lorazepam <25 <25 ng/mL   Drug Screen, Urine With Reflex to Confirmation    Collection Time: 01/10/25  4:04 PM   Result Value Ref Range    Amphetamine Screen, Urine Presumptive Negative Presumptive Negative    Barbiturate Screen, Urine Presumptive Negative Presumptive Negative    Benzodiazepines Screen, Urine Presumptive Negative Presumptive Negative    Cannabinoid Screen, Urine Presumptive Negative Presumptive Negative    Cocaine Metabolite Screen, Urine Presumptive Negative Presumptive Negative     Fentanyl Screen, Urine Presumptive Negative Presumptive Negative    Opiate Screen, Urine Presumptive Negative Presumptive Negative    Oxycodone Screen, Urine Presumptive Negative Presumptive Negative    PCP Screen, Urine Presumptive Negative Presumptive Negative    Methadone Screen, Urine Presumptive Negative Presumptive Negative     Results are as expected.   Clinical rationale for not completing a Urine Drug Screen:     Controlled Substance Agreement:  Date of the Last Agreement: 110/25  Reviewed Controlled Substance Agreement including but not limited to the benefits, risks, and alternatives to treatment with a Controlled Substance medication(s).  Reviewed Controlled Substance Agreement including but not limited to the benefits, risk, and alternatives to treatment with a Controlled Substance medication(s)     Benzodiazepines:  What is the patient's goal of therapy? Maintain tolerable anxiety levels  Is this being achieved with current treatment? yes    DENISA-7 Total Score: 11      Activities of Daily Living:   Is your overall impression that this patient is benefiting (symptom reduction outweighs side effects) from benzodiazepine therapy? Yes     1. Physical Functioning: Same  2. Family Relationship: Same  3. Social Relationship: Same  4. Mood: Same  5. Sleep Patterns: Same  6. Overall Function: Same     Review of Systems    Review of Systems negative except as noted in HPI and Chief complaint.     Objective   Patient Health Questionnaire-9 Score: 13     DENISA-7 Total Score: 11     VITALS:  /80 (BP Location: Left arm, Patient Position: Sitting, BP Cuff Size: Adult)   Pulse 78   Ht 1.524 m (5')   Wt 55.8 kg (123 lb)   LMP 03/09/2025 (Exact Date)   SpO2 99%   BMI 24.02 kg/m²      Physical Exam  Constitutional:       General: She is not in acute distress.     Appearance: Normal appearance. She is not ill-appearing.   HENT:      Head: Normocephalic and atraumatic.   Neck:      Vascular: No carotid bruit.    Cardiovascular:      Rate and Rhythm: Normal rate and regular rhythm.      Pulses: Normal pulses.      Heart sounds: Normal heart sounds. No murmur heard.     No gallop.   Pulmonary:      Effort: Pulmonary effort is normal.      Breath sounds: Normal breath sounds. No wheezing, rhonchi or rales.   Musculoskeletal:      Cervical back: Normal range of motion and neck supple. No rigidity or tenderness.   Lymphadenopathy:      Cervical: No cervical adenopathy.   Skin:     General: Skin is warm and dry.   Neurological:      Mental Status: She is alert.   Psychiatric:         Mood and Affect: Mood normal.         Behavior: Behavior normal.         Assessment/Plan   Assessment & Plan  Anxiety  Transitioning to Cymbalta - alternate Escitalopram and Duloxetine for 1-2 weeks and then switch to Duloxetine alone.  Call with update in 1-2 months, sooner with any problems or concerns  Orders:    ALPRAZolam (Xanax) 0.25 mg tablet; Take 1 tablet (0.25 mg) by mouth 2 times a day as needed for anxiety.    escitalopram (Lexapro) 20 mg tablet; Take 1 tablet (20 mg) by mouth 2 times a day.    DULoxetine (Cymbalta) 30 mg DR capsule; Take 1 capsule (30 mg) by mouth once daily. Do not crush or chew.    Primary insomnia  Trial of Mirtazapine.  Start with 1/2 tablet at bedtime, may increase t full tablet as tolerated.  Reviewed risks, side effects and expected treatment course of medications.  Call with any problems or concerns.   Orders:    mirtazapine (Remeron) 7.5 mg tablet; Take 1 tablet (7.5 mg) by mouth once daily at bedtime.    Irritable bowel syndrome with constipation  Restarting Linzess - will refer to Clinical pharamcy if needed for assistance in coverage.  Orders:    linaCLOtide (Linzess) 145 mcg capsule; Take 1 capsule (145 mcg) by mouth once daily in the morning. Take before meals. Do not crush or chew.         FOLLOW UP 3 MONTHS, SOONER WITH ANY PROBLEMS OR CONCERNS.

## 2025-04-08 NOTE — LETTER
April 8, 2025     Patient: Elmira Harrison   YOB: 1995   Date of Visit: 4/8/2025       To Whom It May Concern:    Elmira Harrison was seen in my clinic on 4/8/2025 at 9:15 am. Please excuse Elmira for her absence from work on this day to make the appointment.    If you have any questions or concerns, please don't hesitate to call.         Sincerely,         Jael Mccartney DO        CC: No Recipients

## 2025-04-11 RX ORDER — DULOXETIN HYDROCHLORIDE 30 MG/1
30 CAPSULE, DELAYED RELEASE ORAL DAILY
Qty: 30 CAPSULE | Refills: 1 | Status: SHIPPED | OUTPATIENT
Start: 2025-04-11

## 2025-04-11 RX ORDER — LUBIPROSTONE 8 UG/1
8 CAPSULE ORAL
Qty: 60 CAPSULE | Refills: 0 | Status: SHIPPED | OUTPATIENT
Start: 2025-04-11 | End: 2025-04-11

## 2025-04-17 ENCOUNTER — PATIENT MESSAGE (OUTPATIENT)
Dept: PRIMARY CARE | Facility: CLINIC | Age: 30
End: 2025-04-17
Payer: COMMERCIAL

## 2025-04-17 DIAGNOSIS — K58.1 IRRITABLE BOWEL SYNDROME WITH CONSTIPATION: ICD-10-CM

## 2025-05-18 ENCOUNTER — OFFICE VISIT (OUTPATIENT)
Dept: URGENT CARE | Age: 30
End: 2025-05-18
Payer: COMMERCIAL

## 2025-05-18 ENCOUNTER — ANCILLARY PROCEDURE (OUTPATIENT)
Dept: URGENT CARE | Age: 30
End: 2025-05-18
Payer: COMMERCIAL

## 2025-05-18 VITALS
HEART RATE: 72 BPM | OXYGEN SATURATION: 99 % | DIASTOLIC BLOOD PRESSURE: 63 MMHG | TEMPERATURE: 97.8 F | SYSTOLIC BLOOD PRESSURE: 117 MMHG | RESPIRATION RATE: 18 BRPM

## 2025-05-18 DIAGNOSIS — S99.912A INJURY OF LEFT ANKLE, INITIAL ENCOUNTER: ICD-10-CM

## 2025-05-18 DIAGNOSIS — S93.492A SPRAIN OF ANTERIOR TALOFIBULAR LIGAMENT OF LEFT ANKLE, INITIAL ENCOUNTER: Primary | ICD-10-CM

## 2025-05-18 PROCEDURE — 73610 X-RAY EXAM OF ANKLE: CPT | Mod: LEFT SIDE | Performed by: PHYSICIAN ASSISTANT

## 2025-05-18 PROCEDURE — 73630 X-RAY EXAM OF FOOT: CPT | Mod: LEFT SIDE | Performed by: PHYSICIAN ASSISTANT

## 2025-05-18 ASSESSMENT — ENCOUNTER SYMPTOMS
ARTHRALGIAS: 1
CHILLS: 0
WOUND: 0
NUMBNESS: 0
FEVER: 0
WEAKNESS: 0
JOINT SWELLING: 1

## 2025-05-18 NOTE — LETTER
May 21, 2025     Patient: Elmira Harrison   YOB: 1995   Date of Visit: 5/18/2025       To Whom It May Concern:    Elmira Harrison was seen in my clinic on 5/18/2025.   Needs to use boot and crutches for injury until cleared by orthopedics.     If you have any questions or concerns, please don't hesitate to call.         Sincerely,         Lorena Napier PA-C        CC: No Recipients

## 2025-05-18 NOTE — LETTER
May 18, 2025     Patient: Elmira Harrison   YOB: 1995   Date of Visit: 5/18/2025       To Whom It May Concern:    Elmira Harrison was seen in my clinic on 5/18/2025 at 3:30 pm. Please excuse Elmira for her absence from work from 5/19-5/20/25.    If you have any questions or concerns, please don't hesitate to call.         Sincerely,         Lorena Napier PA-C

## 2025-05-18 NOTE — PROGRESS NOTES
"Subjective   Patient ID: Elmira Harrison is a 30 y.o. female. They present today with a chief complaint of Injury (Pt advised that she injured her left ankle today. Pt advised that she rolled her ankle and then heard a \"Pop and Crack\". ).    History of Present Illness  Patient presents for evaluation of left ankle injury that was sustained earlier today.  She states she was working out in the yard when she rolled her left ankle in an inversion injury.  She felt a pop and crack.  She has had pain to lateral aspect of her ankle ever since that time.  She states that is difficult to walk, bear weight and move her foot.  Denies any numbness, tingling, open wound.  She states there is no chance she could be pregnant today.          Past Medical History  Allergies as of 05/18/2025 - Reviewed 05/18/2025   Allergen Reaction Noted    Penicillins Rash, Unknown, and Nausea/vomiting 02/01/2011    Valium [diazepam] Hives and Palpitations 11/20/2024    Acetaminophen Itching 08/07/2018    Metoprolol Hives 06/19/2024       Prescriptions Prior to Admission[1]     Medical History[2]    Surgical History[3]     reports that she has quit smoking. Her smoking use included cigarettes. She has never used smokeless tobacco. She reports that she does not currently use alcohol. She reports that she does not use drugs.    Review of Systems  Review of Systems   Constitutional:  Negative for chills and fever.   Musculoskeletal:  Positive for arthralgias (left ankle) and joint swelling.   Skin:  Negative for wound.   Neurological:  Negative for weakness and numbness.                                  Objective    Vitals:    05/18/25 1539   BP: 117/63   Pulse: 72   Resp: 18   Temp: 36.6 °C (97.8 °F)   SpO2: 99%     No LMP recorded.    Physical Exam  Vitals reviewed.   Constitutional:       General: She is not in acute distress.     Appearance: She is not ill-appearing.   Musculoskeletal:      Left ankle: No swelling, deformity, " ecchymosis or lacerations. Tenderness present over the lateral malleolus, ATF ligament, posterior TF ligament and base of 5th metatarsal. No medial malleolus or proximal fibula (pain noted to distal fibula) tenderness. Decreased range of motion (slightly with pain noted). Anterior drawer test negative.      Left Achilles Tendon: No tenderness or defects.   Skin:     General: Skin is warm.      Capillary Refill: Capillary refill takes less than 2 seconds.   Neurological:      Mental Status: She is alert.      Sensory: Sensation is intact.      Comments: Gait is antalgic         Procedures    Point of Care Test & Imaging Results from this visit  No results found for this visit on 05/18/25.   Imaging  XR ankle left 3+ views  Result Date: 5/18/2025  Small ankle effusion. No acute osseous abnormality seen     MACRO: None   Signed by: Adrian Ozuna 5/18/2025 4:31 PM Dictation workstation:   [id number]    XR foot left 3+ views  Result Date: 5/18/2025  No acute abnormality in the left foot     MACRO: None   Signed by: Adrian Ozuna 5/18/2025 4:30 PM Dictation workstation:   [id number]      Cardiology, Vascular, and Other Imaging  No other imaging results found for the past 2 days      Diagnostic study results (if any) were reviewed by Lorena Napier PA-C.    Assessment/Plan   Allergies, medications, history, and pertinent labs/EKGs/Imaging reviewed by Lorena Napier PA-C.     Medical Decision Making  MDM- History and examination consistent with ligament sprain. No evidence of neurovascular compromise or bony injury at this time. Xray obtained of ankle and foot in light of fibula, lateral malleolar and 5th metatarsal pain; all were negative for fracture. She was fitted for walking boot and crutches. Referral given to ortho to follow up if not improving over the next few weeks. Treatment is supportive measures and PCP follow up. ED if worsens. Patient verbalized understanding and agrees with plan.      Orders and  Diagnoses  Diagnoses and all orders for this visit:  Sprain of anterior talofibular ligament of left ankle, initial encounter  -     Referral to Orthopedics and Sports Medicine; Future  Injury of left ankle, initial encounter  -     XR ankle left 3+ views; Future  -     XR foot left 3+ views; Future      Medical Admin Record      Patient disposition: Home    Electronically signed by Lorena Napier PA-C  4:42 PM           [1] (Not in a hospital admission)   [2]   Past Medical History:  Diagnosis Date    ADHD     Allergic purpura 12/11/2013    Henoch-Schonlein purpura    Anemia     Endometriosis     Fibroid     GERD (gastroesophageal reflux disease)     Inflammatory myopathy     Intestinal malrotation (Multi)     Migraine     PCOS (polycystic ovarian syndrome)     PONV (postoperative nausea and vomiting)     Retroesophageal right subclavian artery    [3]   Past Surgical History:  Procedure Laterality Date    APPENDECTOMY  06/18/2021    CHOLECYSTECTOMY  05/22/2016    LAPAROSCOPY DIAGNOSTIC / BIOPSY / ASPIRATION / LYSIS      Exploratory Laparoscopy x3

## 2025-05-20 ENCOUNTER — OFFICE VISIT (OUTPATIENT)
Dept: ORTHOPEDIC SURGERY | Facility: CLINIC | Age: 30
End: 2025-05-20
Payer: COMMERCIAL

## 2025-05-20 VITALS — WEIGHT: 123 LBS | BODY MASS INDEX: 24.15 KG/M2 | HEIGHT: 60 IN

## 2025-05-20 DIAGNOSIS — S93.492A SPRAIN OF ANTERIOR TALOFIBULAR LIGAMENT OF LEFT ANKLE, INITIAL ENCOUNTER: Primary | ICD-10-CM

## 2025-05-20 PROCEDURE — L4361 PNEUMA/VAC WALK BOOT PRE OTS: HCPCS | Performed by: EMERGENCY MEDICINE

## 2025-05-20 PROCEDURE — 3008F BODY MASS INDEX DOCD: CPT | Performed by: EMERGENCY MEDICINE

## 2025-05-20 PROCEDURE — 99204 OFFICE O/P NEW MOD 45 MIN: CPT | Performed by: EMERGENCY MEDICINE

## 2025-05-20 NOTE — PROGRESS NOTES
Subjective:  Chief Complaint: Pain of the Left Ankle       Patient ID: Elmira Harrison is a 30 y.o. coming in with left ankle injury. Patient states she was working out in her garden and she stepped back into loose gravel when she rolled her left ankle.  She felt a pop and crack.    XR done 5/18/25.      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  She had x-rays done on 5/18/2025 after she inverted the ankle while working in her garden.  She has anterior lateral pain with a trace amount of swelling.  She is able to bear weight and her x-rays did not reveal fracture.  She was placed in a short ankle boot at the urgent care facility that she went to and has been taking a combination of ibuprofen and Tylenol as needed for her pain and swelling.  No other complaints here today.  Her pain is mild to moderate and is obviously worse with bearing weight or with activity.  She is worried about how she will be able to function at work when she goes back.          Current Medications[1]     RX Allergies[2]     Objective:   Right Ankle Exam   Right ankle exam is normal.       Left Ankle Exam     Tenderness   The patient is experiencing tenderness in the CF and ATF (No tenderness palpation over the syndesmosis or proximal fibular head).   Swelling: mild    Range of Motion   Dorsiflexion:  abnormal Left ankle dorsiflexion: Range of motion slightly decreased secondary to swelling and some pain.  Plantar flexion:  abnormal     Muscle Strength   The patient has normal left ankle strength.  Dorsiflexion:  5/5   Plantar flexion:  5/5   Anterior tibial:  5/5   Posterior tibial:  5/5  Gastrocsoleus:  5/5  Peroneal muscle:  5/5    Tests   Anterior drawer: positive  Varus tilt: positive    Other    Erythema: absent  Sensation: normal             Imaging Results:   XR ankle left 3+ views  Narrative: Interpreted By:  Adrian Ozuna,   STUDY:  XR ANKLE LEFT 3+ VIEWS; ;  5/18/2025 4:30 pm      INDICATION:  Signs/Symptoms:inversion injury to left ankle. pain to distal  fibula/lateral malleolus/base of 5th metatarsal. r/o fracture.      ,S99.912A Unspecified injury of left ankle, initial encounter      COMPARISON:  None.      ACCESSION NUMBER(S):  [id number]      ORDERING CLINICIAN:  FLASH BROOKS      FINDINGS:  Left ankle, three views      There is no fracture. There is no dislocation. There are no  degenerative changes. There is a small ankle joint effusion. There is  no soft tissue abnormality seen.      Impression: Small ankle effusion. No acute osseous abnormality seen          MACRO:  None      Signed by: Adrian Ozuna 5/18/2025 4:31 PM  Dictation workstation:   [id number]  XR foot left 3+ views  Narrative: Interpreted By:  Adrian Ozuna,   STUDY:  XR FOOT LEFT 3+ VIEWS; ;  5/18/2025 4:29 pm      INDICATION:  Signs/Symptoms:inversion injury to left ankle. pain to distal  fibula/lateral malleolus/base of 5th metatarsal. r/o fracture.      ,S99.912A Unspecified injury of left ankle, initial encounter      COMPARISON:  None.      ACCESSION NUMBER(S):  [id number]      ORDERING CLINICIAN:  FLASH BROOKS      FINDINGS:  Left foot, three views      There is no fracture. There is no dislocation. There are no  degenerative changes. There is no lytic or sclerotic lesion. There is  no soft tissue abnormality seen.      Impression: No acute abnormality in the left foot          MACRO:  None      Signed by: Adrian Ozuna 5/18/2025 4:30 PM  Dictation workstation:   [id number]    X-rays of the left foot and ankle were reviewed and interpreted by me on 5/20/2025.  No evidence of acute injury or fracture.    Procedures     Assessment/Plan:  Referring Provider: Flash Brooks PA-C    Encounter Diagnoses:    Sprain of anterior talofibular ligament of left ankle, initial encounter     Orders Placed This Encounter    Referral to Physical Therapy      No follow-ups on file.     We discussed her treatment options and eventually decided to place her in a tall walking boot for the next 1 to 2 weeks to help immobilize the ankle while it heals.  She can be weightbearing as tolerated and should even consider purchasing a scooter to help her function while she is at work since she sees so many patients daily.  She can continue taking 600 mg of ibuprofen 2-3 times a day along with 1000 mg of Tylenol 2-3 times a day as needed for breakthrough pain and also knows to ice and elevate the left ankle is much as possible.  She is going to wean out of the boot in the next 1 to 2 weeks and is going to purchase a lace up ankle splint on [employer] or over-the-counter to wean into and will follow-up with me in about 6 to 8 weeks to see how she is doing.  I am going to send her to PT.  If she fails to improve we will likely consider an MRI at her next visit but right now my concern for surgical pathology or severe ankle instability is relatively low.    **Patient was prescribed a walking boot for [ankle sprain].The patient is ambulatory with or without aid; but, has weakness, instability and/or deformity of their [left ankle] which requires stabilization from this orthosis to improve their function.       Verbal and written instructions for the use, wear schedule, cleaning and application of this item were given.  Patient was instructed that should the brace result in increased pain, decreased sensation, increased swelling, or an overall worsening of their medical condition, to please contact our office immediately.      Orthotic management and training was provided for skin care, modifications due to healing tissues, edema changes, interruption in skin integrity, and safety precautions with the orthosis.**    ** Please excuse any errors in  grammar or translation related to this dictation. Voice recognition software was utilized to prepare this document. **         Roni Marin MD  [facility]          [1]   Current Outpatient Medications   Medication Sig Dispense Refill    ALPRAZolam (Xanax) 0.25 mg tablet Take 1 tablet (0.25 mg) by mouth 2 times a day as needed for anxiety. 30 tablet 0    clobetasol (Temovate) 0.05 % external solution Apply topically.      DULoxetine (Cymbalta) 30 mg DR capsule Take 1 capsule (30 mg) by mouth once daily. Do not crush or chew. 30 capsule 1    escitalopram (Lexapro) 20 mg tablet Take 1 tablet (20 mg) by mouth 2 times a day. 180 tablet 0    linaCLOtide (Linzess) 145 mcg capsule Take 1 capsule (145 mcg) by mouth once daily in the morning. Take before meals. Do not crush or chew. 90 capsule 3    magnesium citrate 100 mg tablet Take 1 tablet by mouth once daily.      megestrol (Megace) 20 mg tablet Take 1 tablet (20 mg total) by mouth if needed.      metFORMIN, OSM, (Fortamet) 500 mg 24 hr tablet Take 1 tablet (500 mg) by mouth once daily in the evening. Take with meals. Do not crush, chew, or split. 90 tablet 3    metoprolol succinate XL (Toprol-XL) 25 mg 24 hr tablet Take 0.5 tablets (12.5 mg) by mouth once daily. Do not crush or chew. 15 tablet 11    mirtazapine (Remeron) 7.5 mg tablet Take 1 tablet (7.5 mg) by mouth once daily at bedtime. 30 tablet 0    pantoprazole (ProtoNix) 40 mg EC tablet Take 1 tablet (40 mg) by mouth once daily. Do not crush, chew, or split. 90 tablet 1     No current facility-administered medications for this visit.   [2]   Allergies  Allergen Reactions    Penicillins Rash, Unknown and Nausea/vomiting     Other reaction(s): Unknown    Valium [Diazepam] Hives and Palpitations    Acetaminophen Itching    Metoprolol Hives

## 2025-05-20 NOTE — LETTER
May 20, 2025     Patient: Elmira Harrison   YOB: 1995   Date of Visit: 5/20/2025       To Whom It May Concern:    It is my medical opinion that Elmira Harrison may return to work 5/21/25 and must wear walking boot.    If you have any questions or concerns, please don't hesitate to call.         Sincerely,        Roni Marin MD

## 2025-05-29 ENCOUNTER — TELEPHONE (OUTPATIENT)
Dept: ORTHOPEDIC SURGERY | Facility: CLINIC | Age: 30
End: 2025-05-29
Payer: COMMERCIAL

## 2025-05-29 NOTE — TELEPHONE ENCOUNTER
Need a new work note that is more specific.   Needs to state that she doesn't have to use her crutches but can if she needs to and that she is weaning out of the boot in the next 2 weeks.

## 2025-05-29 NOTE — LETTER
May 29, 2025     Patient: Elmira Harrison   YOB: 1995   Date of Visit: 5/20/2025       To Whom It May Concern:    It is my medical opinion that Elmira Harrison may return to work on 5/21/25. She must wear her walking boot. She doesn't need to use her crutches, but can if needed. She will be weaning out of the boot in the next two weeks.       If you have any questions or concerns, please don't hesitate to call.         Sincerely,        Roni Marin MD

## 2025-07-08 ENCOUNTER — APPOINTMENT (OUTPATIENT)
Dept: ORTHOPEDIC SURGERY | Facility: CLINIC | Age: 30
End: 2025-07-08
Payer: COMMERCIAL

## 2025-07-08 VITALS — BODY MASS INDEX: 24.15 KG/M2 | WEIGHT: 123 LBS | HEIGHT: 60 IN

## 2025-07-08 DIAGNOSIS — S93.492D SPRAIN OF ANTERIOR TALOFIBULAR LIGAMENT, LEFT, SUBSEQUENT ENCOUNTER: Primary | ICD-10-CM

## 2025-07-08 DIAGNOSIS — M25.50 POLYARTHRALGIA: ICD-10-CM

## 2025-07-08 PROCEDURE — 99213 OFFICE O/P EST LOW 20 MIN: CPT | Performed by: EMERGENCY MEDICINE

## 2025-07-08 PROCEDURE — 3008F BODY MASS INDEX DOCD: CPT | Performed by: EMERGENCY MEDICINE

## 2025-07-08 PROCEDURE — L1902 AFO ANKLE GAUNTLET PRE OTS: HCPCS | Performed by: EMERGENCY MEDICINE

## 2025-07-08 NOTE — PROGRESS NOTES
Subjective:  Chief Complaint: Follow-up and Pain of the Left Ankle (Doing better and has been out of boot with no issues, still having little pain )       Patient ID: Elmira Harrison is a 30 y.o. is following up to the office.      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  She had x-rays done on 5/18/2025 after she inverted the ankle while working in her garden.  She has anterior lateral pain with a trace amount of swelling.  She is able to bear weight and her x-rays did not reveal fracture.  She was placed in a short ankle boot at the urgent care facility that she went to and has been taking a combination of ibuprofen and Tylenol as needed for her pain and swelling.  No other complaints here today.  Her pain is mild to moderate and is obviously worse with bearing weight or with activity.  She is worried about how she will be able to function at work when she goes back. We discussed her treatment options and eventually decided to place her in a tall walking boot for the next 1 to 2 weeks to help immobilize the ankle while it heals.  She can be weightbearing as tolerated and should even consider purchasing a scooter to help her function while she is at work since she sees so many patients daily.  She can continue taking 600 mg of ibuprofen 2-3 times a day along with 1000 mg of Tylenol 2-3 times a day as needed for breakthrough pain and also knows to ice and elevate the left ankle is much as possible.  She is going to wean out of the boot in the next 1 to 2 weeks and is going to purchase a lace up ankle splint on [employer] or over-the-counter to wean into and will follow-up with me in about 6 to 8 weeks to see how she is doing.  I am going to send her to PT.  If she fails to improve  we will likely consider an MRI at her next visit but right now my concern for surgical pathology or severe ankle instability is relatively low.    Update on 7/8/2025.  Patient is coming back in for a follow-up visit for her left ankle sprain.  Overall she states that she is feeling better but by the end of the day she does have a lot of pain when she is trying to sleep.  She has not fallen and has not had any additional episodes of instability.  She has not yet started physical therapy.  She has been out of the boot but has also not yet started wearing the lace up ankle brace.  Of note she is complaining of some symptoms in multiple joints and is curious about a good rheumatologist.    Left Ankle          Current Medications[1]     RX Allergies[2]     Objective:   Right Ankle Exam   Right ankle exam is normal.       Left Ankle Exam     Tenderness   The patient is experiencing no tenderness.   Swelling: none    Range of Motion   Dorsiflexion:  normal   Plantar flexion:  normal     Muscle Strength   The patient has normal left ankle strength.  Dorsiflexion:  5/5   Plantar flexion:  5/5   Anterior tibial:  5/5   Posterior tibial:  5/5  Gastrocsoleus:  5/5  Peroneal muscle:  5/5    Tests   Anterior drawer: negative  Varus tilt: negative    Other   Erythema: absent  Sensation: normal             Imaging Results:   XR ankle left 3+ views  Narrative: Interpreted By:  Adrian Ozuna,   STUDY:  XR ANKLE LEFT 3+ VIEWS; ;  5/18/2025 4:30 pm      INDICATION:  Signs/Symptoms:inversion injury to left ankle. pain to distal  fibula/lateral malleolus/base of 5th metatarsal. r/o fracture.      ,S99.912A Unspecified injury of left ankle, initial encounter      COMPARISON:  None.      ACCESSION NUMBER(S):  [id number]      ORDERING CLINICIAN:  FLASH BROOKS      FINDINGS:  Left ankle, three views      There is no fracture. There is no dislocation. There are no  degenerative changes. There is a small ankle joint effusion. There  is  no soft tissue abnormality seen.      Impression: Small ankle effusion. No acute osseous abnormality seen          MACRO:  None      Signed by: Adrian Ozuna 5/18/2025 4:31 PM  Dictation workstation:   [id number]  XR foot left 3+ views  Narrative: Interpreted By:  Adrian Ozuna,   STUDY:  XR FOOT LEFT 3+ VIEWS; ;  5/18/2025 4:29 pm      INDICATION:  Signs/Symptoms:inversion injury to left ankle. pain to distal  fibula/lateral malleolus/base of 5th metatarsal. r/o fracture.      ,S99.912A Unspecified injury of left ankle, initial encounter      COMPARISON:  None.      ACCESSION NUMBER(S):  [id number]      ORDERING CLINICIAN:  FLASH BROOKS      FINDINGS:  Left foot, three views      There is no fracture. There is no dislocation. There are no  degenerative changes. There is no lytic or sclerotic lesion. There is  no soft tissue abnormality seen.      Impression: No acute abnormality in the left foot          MACRO:  None      Signed by: Adrian Ozuna 5/18/2025 4:30 PM  Dictation workstation:   [id number]    No new imaging    Procedures     Assessment/Plan:  Referring Provider: No ref. provider found    Encounter Diagnoses:   Sprain of anterior talofibular ligament, left, subsequent encounter    Polyarthralgia     Orders Placed This Encounter    Referral to Physical Therapy    Referral to Rheumatology      No follow-ups on file.     We discussed her treatment options and eventually decided to place her in a lace up ankle brace.  I am going to send her to physical therapy.  She is going to follow-up with Dr. Henderson for her polyarthralgias and will follow-up with me as needed moving forward.  She knows that if her symptoms do not fully resolve in the next 1 to 2 months and her ankle that she should contact my office and we would likely move forward with an MRI.  However at this time my concern for surgical pathology is relatively low.    **Patient was prescribed a lace up ankle brace left ankle sprain  for [diagnosis].The patient is ambulatory with or without aid; but, has weakness, instability and/or deformity of their [left ankle] which requires stabilization from this orthosis to improve their function.       Verbal and written instructions for the use, wear schedule, cleaning and application of this item were given.  Patient was instructed that should the brace result in increased pain, decreased sensation, increased swelling, or an overall worsening of their medical condition, to please contact our office immediately.      Orthotic management and training was provided for skin care, modifications due to healing tissues, edema changes, interruption in skin integrity, and safety precautions with the orthosis.**    ** Please excuse any errors in grammar or translation related to this dictation. Voice recognition software was utilized to prepare this document. **         Roni Marin MD  [facility]            [1]   Current Outpatient Medications   Medication Sig Dispense Refill    ALPRAZolam (Xanax) 0.25 mg tablet Take 1 tablet (0.25 mg) by mouth 2 times a day as needed for anxiety. 30 tablet 0    clobetasol (Temovate) 0.05 % external solution Apply topically.      DULoxetine (Cymbalta) 30 mg DR capsule Take 1 capsule (30 mg) by mouth once daily. Do not crush or chew. 30 capsule 1    escitalopram (Lexapro) 20 mg tablet Take 1 tablet (20 mg) by mouth 2 times a day. 180 tablet 0    linaCLOtide (Linzess) 145 mcg capsule Take 1 capsule (145 mcg) by mouth once daily in the morning. Take before meals. Do not crush or chew. 90 capsule 3    magnesium citrate 100 mg tablet Take 1 tablet by mouth once daily.      megestrol (Megace) 20 mg tablet Take 1 tablet (20 mg total) by mouth if needed.      metFORMIN, OSM, (Fortamet) 500 mg 24 hr tablet Take 1 tablet (500 mg) by mouth once daily in the evening. Take with meals. Do not crush, chew, or split. 90 tablet 3    metoprolol succinate XL  (Toprol-XL) 25 mg 24 hr tablet Take 0.5 tablets (12.5 mg) by mouth once daily. Do not crush or chew. 15 tablet 11    mirtazapine (Remeron) 7.5 mg tablet Take 1 tablet (7.5 mg) by mouth once daily at bedtime. 30 tablet 0    pantoprazole (ProtoNix) 40 mg EC tablet Take 1 tablet (40 mg) by mouth once daily. Do not crush, chew, or split. 90 tablet 1     No current facility-administered medications for this visit.   [2]   Allergies  Allergen Reactions    Penicillins Rash, Unknown and Nausea/vomiting     Other reaction(s): Unknown    Valium [Diazepam] Hives and Palpitations    Acetaminophen Itching    Metoprolol Hives

## 2025-07-08 NOTE — LETTER
July 8, 2025     Patient: Elmira Harrison   YOB: 1995   Date of Visit: 7/8/2025       To Whom It May Concern:    Elmira Harrison was seen in my clinic on 7/8/2025 at 9:00 am. Please excuse Elmira for her absence from work on this day to make the appointment. She will need to wear the ankle brace while at work.    If you have any questions or concerns, please don't hesitate to call.         Sincerely,         Roni Marin MD

## 2025-07-21 ENCOUNTER — OFFICE VISIT (OUTPATIENT)
Dept: GASTROENTEROLOGY | Facility: CLINIC | Age: 30
End: 2025-07-21
Payer: COMMERCIAL

## 2025-07-21 VITALS — BODY MASS INDEX: 23.64 KG/M2 | HEIGHT: 60 IN | WEIGHT: 120.4 LBS

## 2025-07-21 DIAGNOSIS — K58.1 IRRITABLE BOWEL SYNDROME WITH CONSTIPATION: Primary | ICD-10-CM

## 2025-07-21 DIAGNOSIS — K59.09 CHRONIC CONSTIPATION: ICD-10-CM

## 2025-07-21 PROCEDURE — 99214 OFFICE O/P EST MOD 30 MIN: CPT | Performed by: INTERNAL MEDICINE

## 2025-07-21 PROCEDURE — 3008F BODY MASS INDEX DOCD: CPT | Performed by: INTERNAL MEDICINE

## 2025-07-21 ASSESSMENT — ENCOUNTER SYMPTOMS: SHORTNESS OF BREATH: 0

## 2025-07-21 NOTE — PATIENT INSTRUCTIONS
Increase to Linzess 290 mcg daily. Ensure good hydration, regular exercise, and high fiber diet. Follow-up in the office in 3 months.

## 2025-07-21 NOTE — PROGRESS NOTES
REASON FOR VISIT:  IBS-C  PCP (requesting provider): Jael Mccartney DO.    HPI:  Elmira Harrison is a 30 y.o. female with a past medical history of anxiety being evaluated for IBS-C. CT A/P w/ PO contrast showed jejunal loops on right consistent with malrotation (9/2024).     The patient reports she has struggled with constipation especially since a miscarriage last year. She reports having a bowel movement once every week or every 2 weeks. She notes a history of intestinal malrotation. She takes Magnesium Citrate. She also notes Linzess does not always help. She has gas pains and abdominal pain. She takes Gas-X as well. She is currently taking Linzess 145 mcg daily. When she has a bowel movement then she will have a loose BM. She reports being diagnosed with dysautonomia. She failed Dulcolax and Miralax. No blood in the stool. She was having the same bowel issues back in 2019 as noted below. She was actually having more diarrhea back at that time.    PSurgHx:  -Appendectomy   -Cholecystectomy     FamHx: MGM with colon cancer.    Prior Endoscopy:  -EGD (1/2025): Normal esophagus, mild gastric erythema (H. Pylori -), normal duodenum.  -EGD (2/2019): Normal esophagus, mild gastric erythema (H. Pylori -), normal duodenum (normal duodenal biopsies).   -Colonoscopy (2/2019): Excellent prep, normal TI (normal TI biopsies), normal colon (normal random colonic biopsies).     PAST MEDICAL HISTORY  Medical History[1]    PAST SURGICAL HISTORY  Surgical History[2]    FAMILY HISTORY  Family History[3]    SOCIAL HISTORY   reports that she has quit smoking. Her smoking use included cigarettes. She has never used smokeless tobacco. She reports that she does not currently use alcohol. She reports that she does not use drugs.    REVIEW OF SYSTEMS  Review of Systems   Respiratory:  Negative for shortness of breath.    Cardiovascular:  Negative for chest pain.   All other systems reviewed and are negative.    A 10+ point  review of systems was otherwise negative except as noted and per HPI.    ALLERGIES  Allergies[4]    MEDICATIONS  Current Outpatient Medications   Medication Instructions    ALPRAZolam (XANAX) 0.25 mg, oral, 2 times daily PRN    clobetasol (Temovate) 0.05 % external solution Apply topically.    DULoxetine (CYMBALTA) 30 mg, oral, Daily, Do not crush or chew.    escitalopram (LEXAPRO) 20 mg, oral, 2 times daily    linaCLOtide (LINZESS) 145 mcg, oral, Daily before breakfast, Do not crush or chew.    magnesium citrate 100 mg tablet 1 tablet, Daily    metFORMIN (OSM) (FORTAMET) 500 mg, oral, Daily with evening meal, Do not crush, chew, or split.    metoprolol succinate XL (TOPROL-XL) 12.5 mg, oral, Daily, Do not crush or chew.    mirtazapine (REMERON) 7.5 mg, oral, Nightly    pantoprazole (PROTONIX) 40 mg, oral, Daily, Do not crush, chew, or split.       VITALS  There were no vitals filed for this visit.   Body mass index is 23.51 kg/m².    PHYSICAL EXAM  CONSTITUTIONAL: NAD, appears stated age  EYES: anicteric sclera, sclera clear  HEAD: normocephalic, atraumatic   NECK: supple   PULMONARY: CTAB  CARDIOVASCULAR: RRR, no M/R/G appreciated   ABDOMEN: soft, bilateral lower abdominal pain  MUSCULOSKELETAL: no edema  SKIN: no jaundice   PSYCHIATRIC: AOx3, appropriate insight and judgement    LABS  WBC (x10*3/uL)   Date Value   09/23/2024 8.7   07/29/2024 9.3   06/06/2024 8.0     WHITE BLOOD CELL COUNT (Thousand/uL)   Date Value   02/25/2025 7.4     Hemoglobin (g/dL)   Date Value   09/23/2024 13.2   07/29/2024 12.6   06/06/2024 12.7     HEMOGLOBIN (g/dL)   Date Value   02/25/2025 13.2     Platelets (x10*3/uL)   Date Value   09/23/2024 349   07/29/2024 306   06/06/2024 323     PLATELET COUNT (Thousand/uL)   Date Value   02/25/2025 319     Sodium (mmol/L)   Date Value   09/23/2024 138   06/06/2024 138   01/14/2023 139   02/25/2021 139   10/13/2020 141     Potassium (mmol/L)   Date Value   09/23/2024 4.0   06/06/2024 4.2    01/14/2023 4.1   02/25/2021 3.9   10/13/2020 3.6     Chloride (mmol/L)   Date Value   09/23/2024 104   06/06/2024 106   01/14/2023 103   02/25/2021 107   10/13/2020 106     Bicarbonate (mmol/L)   Date Value   09/23/2024 24   06/06/2024 24   01/14/2023 27   02/25/2021 24   10/13/2020 25     Urea Nitrogen (mg/dL)   Date Value   09/23/2024 7   06/06/2024 7   01/14/2023 11   02/25/2021 9   10/13/2020 10     Creatinine (mg/dL)   Date Value   09/23/2024 0.66   06/06/2024 0.66   01/14/2023 0.72   02/25/2021 0.75   10/13/2020 0.62     Calcium (mg/dL)   Date Value   09/23/2024 9.6   06/06/2024 9.0   01/14/2023 9.7   02/25/2021 9.4   10/13/2020 9.9     Total Protein (g/dL)   Date Value   09/23/2024 7.6   06/06/2024 7.4   01/14/2023 7.1   02/25/2021 7.4   10/13/2020 7.2     Bilirubin, Total (mg/dL)   Date Value   09/23/2024 0.6   06/06/2024 0.2     Total Bilirubin (mg/dL)   Date Value   01/14/2023 0.5   02/25/2021 0.3   10/13/2020 0.3     Alkaline Phosphatase (U/L)   Date Value   09/23/2024 32 (L)   06/06/2024 30 (L)   01/14/2023 29 (L)   02/25/2021 31 (L)   10/13/2020 41     ALT (U/L)   Date Value   09/23/2024 9   06/06/2024 6 (L)     ALT (SGPT) (U/L)   Date Value   01/14/2023 9   02/25/2021 8   10/13/2020 10     AST (U/L)   Date Value   09/23/2024 12   06/06/2024 13   01/14/2023 11   02/25/2021 11   10/13/2020 13     Glucose (mg/dL)   Date Value   09/23/2024 78   06/06/2024 90   01/14/2023 83   02/25/2021 88   10/13/2020 80     CRP (mg/dL)   Date Value   10/13/2020 0.12       ASSESSMENT/PLAN  Elmira Harrison is a 30 y.o. female with a past medical history of anxiety being evaluated for IBS-C. CT A/P w/ PO contrast showed jejunal loops on right consistent with malrotation (9/2024). EGD showed mild gastric erythema (1/2025). Colonoscopy normal (2/2019). She previously had IBS-D now flipped to IBS-C. She is still struggling with constipation on Linzess 145 mcg daily so we will trial dose increase to 290 mcg daily  and monitor for improvement.    -Increase to Linzess 290 mcg daily  -If no improvement with Linzess, then can trial Amitiza or Trulance in its place   -Advised good hydration, regular exercise, and high fiber diet     Follow-up in the office in 3 months.    Signature: Ruben Verduzco MD         [1]   Past Medical History:  Diagnosis Date    ADHD     Allergic purpura 12/11/2013    Henoch-Schonlein purpura    Anemia     Ankle sprain 2025    Endometriosis     Fibroid     GERD (gastroesophageal reflux disease)     Inflammatory myopathy     Intestinal malrotation (Multi)     Irritable bowel syndrome     Lumbosacral disc disease 2023    Migraine     PCOS (polycystic ovarian syndrome)     PONV (postoperative nausea and vomiting)     Retroesophageal right subclavian artery    [2]   Past Surgical History:  Procedure Laterality Date    APPENDECTOMY  06/18/2021    CHOLECYSTECTOMY  05/22/2016    LAPAROSCOPY DIAGNOSTIC / BIOPSY / ASPIRATION / LYSIS      Exploratory Laparoscopy x3   [3]   Family History  Problem Relation Name Age of Onset    Cancer Father Lymphoma     Cancer Paternal Grandfather Papa - lymphoma     Cancer Maternal Grandmother Colon Cancer -     Rheumatologic disease Maternal Grandmother Colon Cancer -     Scoliosis Maternal Grandmother Colon Cancer -     Diabetes Maternal Grandfather Diabetes later in life     Colon cancer Maternal Grandmother Colon Cancer -     Colon polyps Maternal Grandmother Colon Cancer -     Crohn's disease Mother's Sister  30 - 39   [4]   Allergies  Allergen Reactions    Penicillins Rash, Unknown and Nausea/vomiting     Other reaction(s): Unknown    Valium [Diazepam] Hives and Palpitations    Acetaminophen Itching    Metoprolol Hives

## 2025-07-23 ENCOUNTER — TELEMEDICINE (OUTPATIENT)
Dept: PRIMARY CARE | Facility: CLINIC | Age: 30
End: 2025-07-23
Payer: COMMERCIAL

## 2025-07-23 DIAGNOSIS — Z13.6 SCREENING FOR CARDIOVASCULAR CONDITION: ICD-10-CM

## 2025-07-23 DIAGNOSIS — Z13.29 THYROID DISORDER SCREENING: ICD-10-CM

## 2025-07-23 DIAGNOSIS — M25.59 PAIN IN OTHER JOINT: Primary | ICD-10-CM

## 2025-07-23 DIAGNOSIS — K21.9 GASTROESOPHAGEAL REFLUX DISEASE WITHOUT ESOPHAGITIS: ICD-10-CM

## 2025-07-23 DIAGNOSIS — F51.01 PRIMARY INSOMNIA: ICD-10-CM

## 2025-07-23 DIAGNOSIS — F41.9 ANXIETY: ICD-10-CM

## 2025-07-23 DIAGNOSIS — Z13.0 SCREENING FOR DISORDER OF BLOOD AND BLOOD-FORMING ORGANS: ICD-10-CM

## 2025-07-23 DIAGNOSIS — Z00.00 ANNUAL PHYSICAL EXAM: ICD-10-CM

## 2025-07-23 DIAGNOSIS — E55.9 VITAMIN D DEFICIENCY: ICD-10-CM

## 2025-07-23 PROBLEM — D75.9 BLOOD DYSCRASIA: Status: RESOLVED | Noted: 2025-01-04 | Resolved: 2025-07-23

## 2025-07-23 PROCEDURE — 99214 OFFICE O/P EST MOD 30 MIN: CPT | Performed by: FAMILY MEDICINE

## 2025-07-23 RX ORDER — ALPRAZOLAM 0.25 MG/1
0.25 TABLET ORAL 2 TIMES DAILY PRN
Qty: 30 TABLET | Refills: 0 | Status: SHIPPED | OUTPATIENT
Start: 2025-07-23

## 2025-07-23 RX ORDER — PANTOPRAZOLE SODIUM 40 MG/1
40 TABLET, DELAYED RELEASE ORAL DAILY
Qty: 90 TABLET | Refills: 1 | Status: SHIPPED | OUTPATIENT
Start: 2025-07-23

## 2025-07-23 RX ORDER — DULOXETIN HYDROCHLORIDE 30 MG/1
30 CAPSULE, DELAYED RELEASE ORAL DAILY
Qty: 90 CAPSULE | Refills: 1 | Status: SHIPPED | OUTPATIENT
Start: 2025-07-23

## 2025-07-23 RX ORDER — MIRTAZAPINE 7.5 MG/1
7.5 TABLET, FILM COATED ORAL NIGHTLY
Qty: 90 TABLET | Refills: 0 | Status: SHIPPED | OUTPATIENT
Start: 2025-07-23 | End: 2025-10-21

## 2025-07-23 NOTE — ASSESSMENT & PLAN NOTE
Experiences panic attacks at least twice a week.  - Refill for Xanax.  - Plans to start family counseling sessions.  -The OARRS website was checked and validated.  I have personally reviewed the OARRS report for this patient.  This report has been scanned into the electronic medical record.  I have considered the risks of abuse,, dependence, addiction and diversion.  I believe that it is clinically appropriate for this patient to be prescribed this medication.  The patient has been told not to increase dose or refill sooner than indicated.  Any variation from this practice will results in my denying further prescriptions and possible discharge from care.   Follow-up  3 MONTHS   Orders:    ALPRAZolam (Xanax) 0.25 mg tablet; Take 1 tablet (0.25 mg) by mouth 2 times a day as needed for anxiety.    DULoxetine (Cymbalta) 30 mg DR capsule; Take 1 capsule (30 mg) by mouth once daily. Do not crush or chew.

## 2025-07-23 NOTE — PROGRESS NOTES
Subjective     Patient ID: is a 30 y.o. female who presents for Follow-up (3 month follow up. ).    HPI     Virtual or Telephone Consent    An interactive audio and video telecommunication system which permits real time communications between the patient (at the originating site) and provider (at the distant site) was utilized to provide this telehealth service.   Verbal consent was requested and obtained from Elmira Harrison on this date, 07/23/25 for a telehealth visit and the patient's location was confirmed at the time of the visit.        History of Present Illness  Elmira Harrison presents via virtual visit for evaluation of joint pain, anxiety, sleep issues, and medication management.    ARTHRALGIA:  She experiences severe pain in her hips, knees, ankles, and wrists, predominantly at night. She is uncertain if this could be restless leg syndrome. Her orthopedic doctor has referred her to Dr. Briana Henderson, a rheumatologist, for further evaluation. She has not yet undergone an PARKER test. She also reports hair loss. She has a family history of rheumatoid arthritis and lupus.  - Onset: Predominantly at night.  - Location: Hips, knees, ankles, and wrists.  - Character: Severe pain.  - Severity: Severe pain.    RASH  She has been experiencing a butterfly-like rash on her face, facial redness, and yellow discoloration around her mouth for several years. These symptoms have not worsened, but she has noticed an increase in other symptoms.  - Onset: Several years.  - Location: Face.  - Character: Butterfly-like rash, facial redness, yellow discoloration around the mouth.  - Duration: Several years.  - Severity: Symptoms have not worsened.    Throat Lump Sensation  She reports a sensation of a lump in her throat, even though she has no stomach issues since starting GI treatment. She describes the sensation as if her throat is swollen, but it is not painful. She continues to take pantoprazole for  her stomach and requests a refill.  - Onset: Since starting GI treatment.  - Location: Throat.  - Character: Sensation of a lump, swollen feeling.  - Severity: Not painful.    ANXIETY & PANIC ATTACKS:  Increased stressors with separation and court tentatively upcoming  Panic attacks 1-2 times per week  Starting family counseling    She experiences panic attacks twice a week and is seeking a refill of her Xanax prescription, which she does not take daily. She is currently under increased stress due to personal issues and is actively seeking ways to manage her panic attacks. She is not currently receiving counseling but plans to start family therapy with her son once a month.  - Onset: Twice a week.  - Character: Panic attacks.  - Alleviating/Aggravating Factors: Increased stress due to personal issues.  - Severity: Requires Xanax prescription refill.    OARRS:  Jael Mccartney DO on 7/23/2025 12:35 PM  I have personally reviewed the OARRS report for Elmira Harrison. I have considered the risks of abuse, dependence, addiction and diversion    Is the patient prescribed a combination of a benzodiazepine and opioid?  No    Last Urine Drug Screen / ordered today: No  Recent Results (from the past 8760 hours)   Benzodiazepine Confirmation, Urine    Collection Time: 01/10/25  4:04 PM   Result Value Ref Range    Clonazepam <25 <25 ng/mL    7-Aminoclonazepam <25 <25 ng/mL    Alprazolam <25 <25 ng/mL    Alpha-Hydroxyalprazolam 48 (H) <25 ng/mL    Midazolam <25 <25 ng/mL    Alpha-Hydroxymidazolam <25 <25 ng/mL    Chlordiazepoxide <25 <25 ng/mL    Diazepam <25 <25 ng/mL    Nordiazepam <25 <25 ng/mL    Temazepam <25 <25 ng/mL    Oxazepam <25 <25 ng/mL    Lorazepam <25 <25 ng/mL   Drug Screen, Urine With Reflex to Confirmation    Collection Time: 01/10/25  4:04 PM   Result Value Ref Range    Amphetamine Screen, Urine Presumptive Negative Presumptive Negative    Barbiturate Screen, Urine Presumptive Negative Presumptive  Negative    Benzodiazepines Screen, Urine Presumptive Negative Presumptive Negative    Cannabinoid Screen, Urine Presumptive Negative Presumptive Negative    Cocaine Metabolite Screen, Urine Presumptive Negative Presumptive Negative    Fentanyl Screen, Urine Presumptive Negative Presumptive Negative    Opiate Screen, Urine Presumptive Negative Presumptive Negative    Oxycodone Screen, Urine Presumptive Negative Presumptive Negative    PCP Screen, Urine Presumptive Negative Presumptive Negative    Methadone Screen, Urine Presumptive Negative Presumptive Negative     Results are as expected.     Controlled Substance Agreement:  Date of the Last Agreement: 1/10/2025  Reviewed Controlled Substance Agreement including but not limited to the benefits, risks, and alternatives to treatment with a Controlled Substance medication(s).    Benzodiazepines:  What is the patient's goal of therapy? Maintain tolerable anxiety levels to carry out daily activities  Is this being achieved with current treatment? yes    DENISA-7:  Over the last 2 weeks, how often have you been bothered by any of the following problems?  Feeling nervous, anxious, or on edge: (Patient-Rptd) 1  Not being able to stop or control worrying: (Patient-Rptd) 1  Worrying too much about different things: (Patient-Rptd) 1  Trouble relaxing: (Patient-Rptd) 1  Being so restless that it is hard to sit still: (Patient-Rptd) 1  Becoming easily annoyed or irritable: (Patient-Rptd) 2  Feeling afraid as if something awful might happen: (Patient-Rptd) 2  DENISA-7 Total Score: (Patient-Rptd) 9        Activities of Daily Living:   Is your overall impression that this patient is benefiting (symptom reduction outweighs side effects) from benzodiazepine therapy? Yes     1. Physical Functioning: Worse  2. Family Relationship: Worse  3. Social Relationship: Same  4. Mood: Worse  5. Sleep Patterns: Same  6. Overall Function: Same    INSOMNIA:  She takes mirtazapine intermittently for  sleep but still wakes up at 3 AM.  - Onset: Wakes up at 3 AM.  - Character: Sleep issues.  - Alleviating/Aggravating Factors: Takes mirtazapine intermittently.  - Severity: Wakes up at 3 AM.    Medication Management  She continues to take Cymbalta but has discontinued Lexapro due to constipation. She saw Dr. Verduzco on 07/21/2025, who increased her Lyrica dosage. She has stopped taking metformin as it was causing severe migraines daily. She is currently taking metoprolol for a racing heart, which is well-controlled, although she notes some fluctuations during her menstrual cycle.  - Onset: Discontinued Lexapro due to constipation; stopped taking metformin due to severe migraines daily.  - Character: Medication management.  - Alleviating/Aggravating Factors: Increased Lyrica dosage; metoprolol for racing heart.  - Severity: Well-controlled racing heart with some fluctuations during menstrual cycle.    Marital Status:   Sleep: She wakes up at 3 AM even when taking mirtazapine.    FAMILY HISTORY  - Family history of rheumatoid arthritis (RA), lupus, and thyroid issues      LEFT ANKLE SPRAIN: hips and knees  Worse at night  She has upcoming appointment with rheumatologist  Briana Henderson, rheumatology    HEMATOLOGY: Dr. Rincon  No bleeding disorders        Review of Systems  Review of Systems negative except as noted in HPI and Chief complaint.     Objective     VITALS:  There were no vitals taken for this visit.    Patient Health Questionnaire-9 Score: (Patient-Rptd) 8         Physical Exam    Assessment & Plan  Pain in other joint  Joint pain: Reports significant pain in hips, knees, ankles, and wrists, primarily at night.  - PARKER panel will be ordered to investigate potential autoimmune causes.  - Referral to Dr. Briana Henderson, rheumatologist.    Rash: Describes a butterfly-type rash on face with redness and yellow discoloration around mouth. Present for a couple of years without worsening but with  additional symptoms.  - PARKER panel will help investigate this further.  - Blood work orders placed to start PARKER workup.  Orders:    Arthritis Panel (CMS); Future    C-reactive protein; Future    Anxiety  Experiences panic attacks at least twice a week.  - Refill for Xanax.  - Plans to start family counseling sessions.  -The OARRS website was checked and validated.  I have personally reviewed the OARRS report for this patient.  This report has been scanned into the electronic medical record.  I have considered the risks of abuse,, dependence, addiction and diversion.  I believe that it is clinically appropriate for this patient to be prescribed this medication.  The patient has been told not to increase dose or refill sooner than indicated.  Any variation from this practice will results in my denying further prescriptions and possible discharge from care.   Follow-up  3 MONTHS   Orders:    ALPRAZolam (Xanax) 0.25 mg tablet; Take 1 tablet (0.25 mg) by mouth 2 times a day as needed for anxiety.    DULoxetine (Cymbalta) 30 mg DR capsule; Take 1 capsule (30 mg) by mouth once daily. Do not crush or chew.    Primary insomnia  Takes mirtazapine (Remeron) intermittently but still wakes up at 3 AM.  - Refill for mirtazapine.  Orders:    mirtazapine (Remeron) 7.5 mg tablet; Take 1 tablet (7.5 mg) by mouth once daily at bedtime.    Gastroesophageal reflux disease without esophagitis    Orders:    pantoprazole (ProtoNix) 40 mg EC tablet; Take 1 tablet (40 mg) by mouth once daily. Do not crush, chew, or split.    Annual physical exam  Screening for disorder of blood and blood-forming organs  Screening for cardiovascular condition  Thyroid disorder screening  Orders:    CBC; Future    Comprehensive Metabolic Panel; Future    Lipid Panel; Future    TSH with reflex to Free T4 if abnormal; Future    Vitamin D deficiency  Orders:    Vitamin D 25-Hydroxy,Total (for eval of Vitamin D levels); Future         Assessment & Plan  Medication  management: Currently taking Cymbalta and metoprolol, which have been effective. Discontinued metformin due to severe migraines.  - Refills for Cymbalta and pantoprazole (Protonix).    Follow-up  - Awaiting appointment with Dr. Briana Henderson, rheumatologist.  - Blood work orders placed.       FOLLOW UP 3 MONTHS for CPE.    Jael Mccartney DO 07/23/25 12:30 PM    This medical note was created with the assistance of artificial intelligence (AI) for documentation purposes. The content has been reviewed and confirmed by the healthcare provider for accuracy and completeness. Patient consented to the use of audio recording and use of AI during their visit.

## 2025-07-25 ENCOUNTER — APPOINTMENT (OUTPATIENT)
Dept: PRIMARY CARE | Facility: CLINIC | Age: 30
End: 2025-07-25
Payer: COMMERCIAL

## 2025-07-26 LAB
25(OH)D3+25(OH)D2 SERPL-MCNC: 45 NG/ML (ref 30–100)
ALBUMIN SERPL-MCNC: 4.8 G/DL (ref 3.6–5.1)
ALP SERPL-CCNC: 33 U/L (ref 31–125)
ALT SERPL-CCNC: 12 U/L (ref 6–29)
ANA SER QL IF: NORMAL
ANION GAP SERPL CALCULATED.4IONS-SCNC: 5 MMOL/L (CALC) (ref 7–17)
AST SERPL-CCNC: 13 U/L (ref 10–30)
BILIRUB SERPL-MCNC: 0.4 MG/DL (ref 0.2–1.2)
BUN SERPL-MCNC: 11 MG/DL (ref 7–25)
CALCIUM SERPL-MCNC: 9.2 MG/DL (ref 8.6–10.2)
CHLORIDE SERPL-SCNC: 106 MMOL/L (ref 98–110)
CHOLEST SERPL-MCNC: 197 MG/DL
CHOLEST/HDLC SERPL: 3.8 (CALC)
CO2 SERPL-SCNC: 26 MMOL/L (ref 20–32)
CREAT SERPL-MCNC: 0.71 MG/DL (ref 0.5–0.97)
CRP SERPL-MCNC: NORMAL MG/L
EGFRCR SERPLBLD CKD-EPI 2021: 117 ML/MIN/1.73M2
ERYTHROCYTE [DISTWIDTH] IN BLOOD BY AUTOMATED COUNT: 11.9 % (ref 11–15)
ERYTHROCYTE [SEDIMENTATION RATE] IN BLOOD BY WESTERGREN METHOD: 2 MM/H
GLUCOSE SERPL-MCNC: 92 MG/DL (ref 65–99)
HCT VFR BLD AUTO: 42.9 % (ref 35–45)
HDLC SERPL-MCNC: 52 MG/DL
HGB BLD-MCNC: 14 G/DL (ref 11.7–15.5)
LDLC SERPL CALC-MCNC: 128 MG/DL (CALC)
MCH RBC QN AUTO: 30.2 PG (ref 27–33)
MCHC RBC AUTO-ENTMCNC: 32.6 G/DL (ref 32–36)
MCV RBC AUTO: 92.7 FL (ref 80–100)
NONHDLC SERPL-MCNC: 145 MG/DL (CALC)
PLATELET # BLD AUTO: 307 THOUSAND/UL (ref 140–400)
PMV BLD REES-ECKER: 9.2 FL (ref 7.5–12.5)
POTASSIUM SERPL-SCNC: 4.2 MMOL/L (ref 3.5–5.3)
PROT SERPL-MCNC: 6.9 G/DL (ref 6.1–8.1)
RBC # BLD AUTO: 4.63 MILLION/UL (ref 3.8–5.1)
RHEUMATOID FACT SERPL-ACNC: NORMAL [IU]/ML
SODIUM SERPL-SCNC: 137 MMOL/L (ref 135–146)
TRIGL SERPL-MCNC: 75 MG/DL
TSH SERPL-ACNC: 1.1 MIU/L
URATE SERPL-MCNC: 4.1 MG/DL (ref 2.5–7)
WBC # BLD AUTO: 5.9 THOUSAND/UL (ref 3.8–10.8)

## 2025-07-28 LAB
25(OH)D3+25(OH)D2 SERPL-MCNC: 45 NG/ML (ref 30–100)
ALBUMIN SERPL-MCNC: 4.8 G/DL (ref 3.6–5.1)
ALP SERPL-CCNC: 33 U/L (ref 31–125)
ALT SERPL-CCNC: 12 U/L (ref 6–29)
ANA SER QL IF: NEGATIVE
ANION GAP SERPL CALCULATED.4IONS-SCNC: 5 MMOL/L (CALC) (ref 7–17)
AST SERPL-CCNC: 13 U/L (ref 10–30)
BILIRUB SERPL-MCNC: 0.4 MG/DL (ref 0.2–1.2)
BUN SERPL-MCNC: 11 MG/DL (ref 7–25)
CALCIUM SERPL-MCNC: 9.2 MG/DL (ref 8.6–10.2)
CHLORIDE SERPL-SCNC: 106 MMOL/L (ref 98–110)
CHOLEST SERPL-MCNC: 197 MG/DL
CHOLEST/HDLC SERPL: 3.8 (CALC)
CO2 SERPL-SCNC: 26 MMOL/L (ref 20–32)
CREAT SERPL-MCNC: 0.71 MG/DL (ref 0.5–0.97)
CRP SERPL-MCNC: <3 MG/L
EGFRCR SERPLBLD CKD-EPI 2021: 117 ML/MIN/1.73M2
ERYTHROCYTE [DISTWIDTH] IN BLOOD BY AUTOMATED COUNT: 11.9 % (ref 11–15)
ERYTHROCYTE [SEDIMENTATION RATE] IN BLOOD BY WESTERGREN METHOD: 2 MM/H
GLUCOSE SERPL-MCNC: 92 MG/DL (ref 65–99)
HCT VFR BLD AUTO: 42.9 % (ref 35–45)
HDLC SERPL-MCNC: 52 MG/DL
HGB BLD-MCNC: 14 G/DL (ref 11.7–15.5)
LDLC SERPL CALC-MCNC: 128 MG/DL (CALC)
MCH RBC QN AUTO: 30.2 PG (ref 27–33)
MCHC RBC AUTO-ENTMCNC: 32.6 G/DL (ref 32–36)
MCV RBC AUTO: 92.7 FL (ref 80–100)
NONHDLC SERPL-MCNC: 145 MG/DL (CALC)
PLATELET # BLD AUTO: 307 THOUSAND/UL (ref 140–400)
PMV BLD REES-ECKER: 9.2 FL (ref 7.5–12.5)
POTASSIUM SERPL-SCNC: 4.2 MMOL/L (ref 3.5–5.3)
PROT SERPL-MCNC: 6.9 G/DL (ref 6.1–8.1)
RBC # BLD AUTO: 4.63 MILLION/UL (ref 3.8–5.1)
RHEUMATOID FACT SERPL-ACNC: <10 IU/ML
SODIUM SERPL-SCNC: 137 MMOL/L (ref 135–146)
TRIGL SERPL-MCNC: 75 MG/DL
TSH SERPL-ACNC: 1.1 MIU/L
URATE SERPL-MCNC: 4.1 MG/DL (ref 2.5–7)
WBC # BLD AUTO: 5.9 THOUSAND/UL (ref 3.8–10.8)

## 2025-08-20 ENCOUNTER — PATIENT MESSAGE (OUTPATIENT)
Dept: PRIMARY CARE | Facility: CLINIC | Age: 30
End: 2025-08-20
Payer: COMMERCIAL

## 2025-09-09 ENCOUNTER — APPOINTMENT (OUTPATIENT)
Dept: PRIMARY CARE | Facility: CLINIC | Age: 30
End: 2025-09-09
Payer: COMMERCIAL

## 2025-09-30 ENCOUNTER — APPOINTMENT (OUTPATIENT)
Dept: CARDIOLOGY | Facility: CLINIC | Age: 30
End: 2025-09-30
Payer: COMMERCIAL

## 2025-10-03 ENCOUNTER — APPOINTMENT (OUTPATIENT)
Dept: PRIMARY CARE | Facility: CLINIC | Age: 30
End: 2025-10-03
Payer: COMMERCIAL

## 2025-10-06 ENCOUNTER — APPOINTMENT (OUTPATIENT)
Dept: GASTROENTEROLOGY | Facility: CLINIC | Age: 30
End: 2025-10-06
Payer: COMMERCIAL

## (undated) DEVICE — SCOPE WARMER, LAPAROSCOPE, BAG ONLY, LF

## (undated) DEVICE — TUBE SET, PNEUMOLAR HEATED, SMOKE EVACU, HIGH-FLOW

## (undated) DEVICE — SOLUTION, SCRUB EXIDINE, 4% CHG, 4 OZ

## (undated) DEVICE — SUTURE, VICRYL PLUS, 2-0, 27IN, UR-6, VIOLET, BRAIDED

## (undated) DEVICE — POSITIONING KIT, PAGAZZI, PINK PAD XL, W/ ARM AND HEAD REST

## (undated) DEVICE — Device

## (undated) DEVICE — TRAY, SURESTEP, URINE METER, 16FR, COMPLETE, W/STATLOCK

## (undated) DEVICE — SUTURE, MONOCRYL, 4-0, 27 IN, PS-2, UNDYED

## (undated) DEVICE — NEEDLE, INSUFFLATION, 13GAX120MM, DISP

## (undated) DEVICE — SLEEVE, KII, Z-THREAD, 5X100CM

## (undated) DEVICE — ADHESIVE, SKIN, DERMABOND ADVANCED, 15CM, PEN-STYLE

## (undated) DEVICE — PAD, SANITARY, OBSTETRICAL, W/ADHSV STRIP,11 IN,LF